# Patient Record
Sex: MALE | Race: WHITE | NOT HISPANIC OR LATINO | Employment: OTHER | URBAN - METROPOLITAN AREA
[De-identification: names, ages, dates, MRNs, and addresses within clinical notes are randomized per-mention and may not be internally consistent; named-entity substitution may affect disease eponyms.]

---

## 2023-05-15 ENCOUNTER — APPOINTMENT (INPATIENT)
Dept: VASCULAR ULTRASOUND | Facility: HOSPITAL | Age: 68
End: 2023-05-15

## 2023-05-15 ENCOUNTER — APPOINTMENT (EMERGENCY)
Dept: CT IMAGING | Facility: HOSPITAL | Age: 68
End: 2023-05-15

## 2023-05-15 ENCOUNTER — APPOINTMENT (EMERGENCY)
Dept: RADIOLOGY | Facility: HOSPITAL | Age: 68
End: 2023-05-15

## 2023-05-15 ENCOUNTER — APPOINTMENT (EMERGENCY)
Dept: NON INVASIVE DIAGNOSTICS | Facility: HOSPITAL | Age: 68
End: 2023-05-15

## 2023-05-15 ENCOUNTER — HOSPITAL ENCOUNTER (INPATIENT)
Facility: HOSPITAL | Age: 68
LOS: 3 days | Discharge: HOME | End: 2023-05-18
Attending: EMERGENCY MEDICINE | Admitting: ANESTHESIOLOGY

## 2023-05-15 DIAGNOSIS — I26.99 PULMONARY EMBOLI (HCC): Primary | ICD-10-CM

## 2023-05-15 DIAGNOSIS — R09.02 HYPOXIA: ICD-10-CM

## 2023-05-15 PROBLEM — I26.09 PULMONARY EMBOLISM WITH ACUTE COR PULMONALE (HCC): Status: ACTIVE | Noted: 2023-05-15

## 2023-05-15 PROBLEM — J96.01 ACUTE RESPIRATORY FAILURE WITH HYPOXEMIA (HCC): Status: ACTIVE | Noted: 2023-05-15

## 2023-05-15 PROBLEM — I82.411 ACUTE DEEP VEIN THROMBOSIS (DVT) OF FEMORAL VEIN OF RIGHT LOWER EXTREMITY (HCC): Status: ACTIVE | Noted: 2023-05-15

## 2023-05-15 PROBLEM — E78.5 HLD (HYPERLIPIDEMIA): Status: ACTIVE | Noted: 2023-05-15

## 2023-05-15 PROBLEM — N17.9 AKI (ACUTE KIDNEY INJURY) (HCC): Status: ACTIVE | Noted: 2023-05-15

## 2023-05-15 PROBLEM — I10 HTN (HYPERTENSION): Status: ACTIVE | Noted: 2023-05-15

## 2023-05-15 PROBLEM — E87.20 LACTIC ACIDOSIS: Status: RESOLVED | Noted: 2023-05-15 | Resolved: 2023-05-15

## 2023-05-15 PROBLEM — E87.20 LACTIC ACIDOSIS: Status: ACTIVE | Noted: 2023-05-15

## 2023-05-15 PROBLEM — R65.10 SIRS (SYSTEMIC INFLAMMATORY RESPONSE SYNDROME) (HCC): Status: ACTIVE | Noted: 2023-05-15

## 2023-05-15 LAB
2HR DELTA HS TROPONIN: 63 NG/L
4HR DELTA HS TROPONIN: 41 NG/L
ALBUMIN SERPL BCP-MCNC: 4 G/DL (ref 3.5–5)
ALP SERPL-CCNC: 100 U/L (ref 34–104)
ALT SERPL W P-5'-P-CCNC: 21 U/L (ref 7–52)
ANION GAP SERPL CALCULATED.3IONS-SCNC: 8 MMOL/L (ref 4–13)
AORTIC ROOT: 3.7 CM
APICAL FOUR CHAMBER EJECTION FRACTION: 63 %
APTT PPP: 194 SECONDS (ref 23–37)
APTT PPP: 28 SECONDS (ref 23–37)
ASCENDING AORTA: 3.5 CM
AST SERPL W P-5'-P-CCNC: 22 U/L (ref 13–39)
BASOPHILS # BLD AUTO: 0.09 THOUSANDS/ÂΜL (ref 0–0.1)
BASOPHILS NFR BLD AUTO: 1 % (ref 0–1)
BILIRUB SERPL-MCNC: 0.64 MG/DL (ref 0.2–1)
BILIRUB UR QL STRIP: NEGATIVE
BNP SERPL-MCNC: 28 PG/ML (ref 0–100)
BUN SERPL-MCNC: 21 MG/DL (ref 5–25)
CALCIUM SERPL-MCNC: 9.5 MG/DL (ref 8.4–10.2)
CARDIAC TROPONIN I PNL SERPL HS: 34 NG/L
CARDIAC TROPONIN I PNL SERPL HS: 75 NG/L
CARDIAC TROPONIN I PNL SERPL HS: 97 NG/L
CHLORIDE SERPL-SCNC: 106 MMOL/L (ref 96–108)
CLARITY UR: CLEAR
CO2 SERPL-SCNC: 26 MMOL/L (ref 21–32)
COLOR UR: YELLOW
CREAT SERPL-MCNC: 1.36 MG/DL (ref 0.6–1.3)
E WAVE DECELERATION TIME: 84 MS
EOSINOPHIL # BLD AUTO: 0.25 THOUSAND/ÂΜL (ref 0–0.61)
EOSINOPHIL NFR BLD AUTO: 2 % (ref 0–6)
ERYTHROCYTE [DISTWIDTH] IN BLOOD BY AUTOMATED COUNT: 13.6 % (ref 11.6–15.1)
FRACTIONAL SHORTENING: 38 % (ref 28–44)
GFR SERPL CREATININE-BSD FRML MDRD: 52 ML/MIN/1.73SQ M
GLUCOSE SERPL-MCNC: 137 MG/DL (ref 65–140)
GLUCOSE UR STRIP-MCNC: NEGATIVE MG/DL
HCT VFR BLD AUTO: 48.1 % (ref 36.5–49.3)
HGB BLD-MCNC: 15.6 G/DL (ref 12–17)
HGB UR QL STRIP.AUTO: NEGATIVE
IMM GRANULOCYTES # BLD AUTO: 0.08 THOUSAND/UL (ref 0–0.2)
IMM GRANULOCYTES NFR BLD AUTO: 1 % (ref 0–2)
INR PPP: 0.96 (ref 0.84–1.19)
INTERVENTRICULAR SEPTUM IN DIASTOLE (PARASTERNAL SHORT AXIS VIEW): 1.1 CM
INTERVENTRICULAR SEPTUM: 1.1 CM (ref 0.6–1.1)
KETONES UR STRIP-MCNC: ABNORMAL MG/DL
LAAS-AP2: 15.5 CM2
LAAS-AP4: 16.9 CM2
LACTATE SERPL-SCNC: 1.4 MMOL/L (ref 0.5–2)
LACTATE SERPL-SCNC: 2.3 MMOL/L (ref 0.5–2)
LEFT ATRIUM AREA SYSTOLE SINGLE PLANE A4C: 16 CM2
LEFT ATRIUM SIZE: 4.3 CM
LEFT INTERNAL DIMENSION IN SYSTOLE: 3 CM (ref 2.1–4)
LEFT VENTRICULAR INTERNAL DIMENSION IN DIASTOLE: 4.8 CM (ref 3.5–6)
LEFT VENTRICULAR POSTERIOR WALL IN END DIASTOLE: 0.9 CM
LEFT VENTRICULAR STROKE VOLUME: 74 ML
LEUKOCYTE ESTERASE UR QL STRIP: NEGATIVE
LVSV (TEICH): 74 ML
LYMPHOCYTES # BLD AUTO: 1.55 THOUSANDS/ÂΜL (ref 0.6–4.47)
LYMPHOCYTES NFR BLD AUTO: 14 % (ref 14–44)
MCH RBC QN AUTO: 29.9 PG (ref 26.8–34.3)
MCHC RBC AUTO-ENTMCNC: 32.4 G/DL (ref 31.4–37.4)
MCV RBC AUTO: 92 FL (ref 82–98)
MONOCYTES # BLD AUTO: 0.72 THOUSAND/ÂΜL (ref 0.17–1.22)
MONOCYTES NFR BLD AUTO: 7 % (ref 4–12)
MV E'TISSUE VEL-SEP: 25 CM/S
MV PEAK A VEL: 0.76 M/S
MV PEAK E VEL: 77 CM/S
MV STENOSIS PRESSURE HALF TIME: 24 MS
MV VALVE AREA P 1/2 METHOD: 9.17 CM2
NEUTROPHILS # BLD AUTO: 8.39 THOUSANDS/ÂΜL (ref 1.85–7.62)
NEUTS SEG NFR BLD AUTO: 75 % (ref 43–75)
NITRITE UR QL STRIP: NEGATIVE
NRBC BLD AUTO-RTO: 0 /100 WBCS
PH UR STRIP.AUTO: 5.5 [PH]
PLATELET # BLD AUTO: 320 THOUSANDS/UL (ref 149–390)
PMV BLD AUTO: 8.7 FL (ref 8.9–12.7)
POTASSIUM SERPL-SCNC: 4.3 MMOL/L (ref 3.5–5.3)
PROCALCITONIN SERPL-MCNC: 0.08 NG/ML
PROT SERPL-MCNC: 7.6 G/DL (ref 6.4–8.4)
PROT UR STRIP-MCNC: NEGATIVE MG/DL
PROTHROMBIN TIME: 12.6 SECONDS (ref 11.6–14.5)
RBC # BLD AUTO: 5.21 MILLION/UL (ref 3.88–5.62)
RIGHT ATRIUM AREA SYSTOLE A4C: 19.2 CM2
RIGHT VENTRICLE ID DIMENSION: 4.2 CM
SL CV LEFT ATRIUM LENGTH A2C: 4.4 CM
SL CV PED ECHO LEFT VENTRICLE DIASTOLIC VOLUME (MOD BIPLANE) 2D: 108 ML
SL CV PED ECHO LEFT VENTRICLE SYSTOLIC VOLUME (MOD BIPLANE) 2D: 35 ML
SODIUM SERPL-SCNC: 140 MMOL/L (ref 135–147)
SP GR UR STRIP.AUTO: 1.03 (ref 1–1.03)
TR MAX PG: 53 MMHG
TR PEAK VELOCITY: 3.7 M/S
TRICUSPID ANNULAR PLANE SYSTOLIC EXCURSION: 1.7 CM
TRICUSPID VALVE PEAK REGURGITATION VELOCITY: 3.65 M/S
UROBILINOGEN UR STRIP-ACNC: <2 MG/DL
WBC # BLD AUTO: 11.08 THOUSAND/UL (ref 4.31–10.16)

## 2023-05-15 RX ORDER — LISINOPRIL 5 MG/1
5 TABLET ORAL DAILY
COMMUNITY
End: 2023-05-18

## 2023-05-15 RX ORDER — CHLORHEXIDINE GLUCONATE 0.12 MG/ML
15 RINSE ORAL EVERY 12 HOURS SCHEDULED
Status: DISCONTINUED | OUTPATIENT
Start: 2023-05-15 | End: 2023-05-18 | Stop reason: HOSPADM

## 2023-05-15 RX ORDER — ALBUTEROL SULFATE 2.5 MG/3ML
5 SOLUTION RESPIRATORY (INHALATION) ONCE
Status: COMPLETED | OUTPATIENT
Start: 2023-05-15 | End: 2023-05-15

## 2023-05-15 RX ORDER — HEPARIN SODIUM 1000 [USP'U]/ML
9200 INJECTION, SOLUTION INTRAVENOUS; SUBCUTANEOUS EVERY 6 HOURS PRN
Status: DISCONTINUED | OUTPATIENT
Start: 2023-05-15 | End: 2023-05-18

## 2023-05-15 RX ORDER — ATORVASTATIN CALCIUM 10 MG/1
20 TABLET, FILM COATED ORAL DAILY
Status: DISCONTINUED | OUTPATIENT
Start: 2023-05-15 | End: 2023-05-18 | Stop reason: HOSPADM

## 2023-05-15 RX ORDER — METHYLPREDNISOLONE SODIUM SUCCINATE 125 MG/2ML
60 INJECTION, POWDER, LYOPHILIZED, FOR SOLUTION INTRAMUSCULAR; INTRAVENOUS ONCE
Status: COMPLETED | OUTPATIENT
Start: 2023-05-15 | End: 2023-05-15

## 2023-05-15 RX ORDER — HEPARIN SODIUM 1000 [USP'U]/ML
4600 INJECTION, SOLUTION INTRAVENOUS; SUBCUTANEOUS EVERY 6 HOURS PRN
Status: DISCONTINUED | OUTPATIENT
Start: 2023-05-15 | End: 2023-05-18

## 2023-05-15 RX ORDER — HEPARIN SODIUM 1000 [USP'U]/ML
9200 INJECTION, SOLUTION INTRAVENOUS; SUBCUTANEOUS ONCE
Status: COMPLETED | OUTPATIENT
Start: 2023-05-15 | End: 2023-05-15

## 2023-05-15 RX ORDER — ATORVASTATIN CALCIUM 20 MG/1
20 TABLET, FILM COATED ORAL DAILY
COMMUNITY

## 2023-05-15 RX ORDER — HEPARIN SODIUM 10000 [USP'U]/100ML
3-30 INJECTION, SOLUTION INTRAVENOUS
Status: DISCONTINUED | OUTPATIENT
Start: 2023-05-15 | End: 2023-05-16

## 2023-05-15 RX ADMIN — ALBUTEROL SULFATE 5 MG: 2.5 SOLUTION RESPIRATORY (INHALATION) at 10:47

## 2023-05-15 RX ADMIN — HEPARIN SODIUM 9200 UNITS: 1000 INJECTION INTRAVENOUS; SUBCUTANEOUS at 12:37

## 2023-05-15 RX ADMIN — CEFTRIAXONE SODIUM 1000 MG: 10 INJECTION, POWDER, FOR SOLUTION INTRAVENOUS at 10:47

## 2023-05-15 RX ADMIN — IPRATROPIUM BROMIDE 0.5 MG: 0.5 SOLUTION RESPIRATORY (INHALATION) at 10:47

## 2023-05-15 RX ADMIN — CHLORHEXIDINE GLUCONATE 0.12% ORAL RINSE 15 ML: 1.2 LIQUID ORAL at 17:00

## 2023-05-15 RX ADMIN — HEPARIN SODIUM 18 UNITS/KG/HR: 10000 INJECTION, SOLUTION INTRAVENOUS at 12:42

## 2023-05-15 RX ADMIN — IOHEXOL 85 ML: 350 INJECTION, SOLUTION INTRAVENOUS at 10:38

## 2023-05-15 RX ADMIN — ATORVASTATIN CALCIUM 20 MG: 10 TABLET, FILM COATED ORAL at 17:00

## 2023-05-15 RX ADMIN — METHYLPREDNISOLONE SODIUM SUCCINATE 60 MG: 125 INJECTION, POWDER, FOR SOLUTION INTRAMUSCULAR; INTRAVENOUS at 10:44

## 2023-05-15 NOTE — ASSESSMENT & PLAN NOTE
· Likely prerenal in the setting dehydration insensible losses   · Creatinine on admission 1 36, continue to trend on daily labs  · Monitor I's and O's

## 2023-05-15 NOTE — ASSESSMENT & PLAN NOTE
· Presented today from PCPs office with ongoing shortness of breath   · In the ED was found to be hypoxic with an O2 sat of 85%, placed on 4 L nasal cannula  · CT extensive bilateral PE and near complete occlusion with a saddle embolus of the right lower lobe pulmonary arteries and notable right heart strain   LV/RV ratio 1 6  · Echo with RV, IVC, right atrium dilation, tricuspid regurgitation and elevated right ventricular systolic pressure  · Received heparin bolus and heparin drip initiated  · Venous duplex pending  · Troponin peaked at 97  · BNP 28  · Cardiology consulted  · Continue heparin drip and supplemental O2

## 2023-05-15 NOTE — ED PROVIDER NOTES
History  Chief Complaint   Patient presents with   • Shortness of Breath     Sent by pcp due to sob, 85% RA, no home 02 hx, productive cough recently       HPI  76 yo M presents with SOB since yesterday  Was seen by his primary care doctor today, found to be hypoxic  +Cough  Denies leg pain/swelling, history of DVT or PE  Reports history of smoking, denies any medical problems besides high blood pressure and cholesterol, has been compliant with medications  None       Past Medical History:   Diagnosis Date   • High cholesterol    • Hypertension        No past surgical history on file  No family history on file  I have reviewed and agree with the history as documented  E-Cigarette/Vaping     E-Cigarette/Vaping Substances          Review of Systems   Constitutional: Negative for chills and fever  HENT: Negative for dental problem and ear pain  Eyes: Negative for pain and redness  Respiratory: Positive for cough and shortness of breath  Cardiovascular: Negative for chest pain and palpitations  Gastrointestinal: Negative for abdominal pain and nausea  Endocrine: Negative for polydipsia and polyphagia  Genitourinary: Negative for dysuria and frequency  Musculoskeletal: Negative for arthralgias and joint swelling  Skin: Negative for color change and rash  Neurological: Negative for dizziness and headaches  Psychiatric/Behavioral: Negative for behavioral problems and confusion  All other systems reviewed and are negative  Physical Exam  Physical Exam  Vitals and nursing note reviewed  Constitutional:       General: He is not in acute distress  Appearance: He is well-developed  He is not diaphoretic  HENT:      Head: Atraumatic  Right Ear: External ear normal       Left Ear: External ear normal       Nose: Nose normal    Eyes:      Conjunctiva/sclera: Conjunctivae normal       Pupils: Pupils are equal, round, and reactive to light  Neck:      Vascular: No JVD  Cardiovascular:      Rate and Rhythm: Regular rhythm  Tachycardia present  Heart sounds: Normal heart sounds  No murmur heard  Pulmonary:      Effort: Pulmonary effort is normal  Tachypnea present  No respiratory distress  Breath sounds: No wheezing  Abdominal:      General: Bowel sounds are normal  There is no distension  Palpations: Abdomen is soft  Tenderness: There is no abdominal tenderness  Musculoskeletal:         General: Normal range of motion  Cervical back: Normal range of motion and neck supple  Skin:     General: Skin is warm and dry  Capillary Refill: Capillary refill takes less than 2 seconds  Neurological:      Mental Status: He is alert and oriented to person, place, and time  Cranial Nerves: No cranial nerve deficit     Psychiatric:         Behavior: Behavior normal          Vital Signs  ED Triage Vitals   Temperature Pulse Respirations Blood Pressure SpO2   05/15/23 1004 05/15/23 0957 05/15/23 0957 05/15/23 0957 05/15/23 0957   97 6 °F (36 4 °C) (!) 131 22 119/72 93 %      Temp Source Heart Rate Source Patient Position - Orthostatic VS BP Location FiO2 (%)   05/15/23 1004 05/15/23 1030 05/15/23 1330 05/15/23 1330 --   Oral Monitor Lying Right arm       Pain Score       --                  Vitals:    05/15/23 1200 05/15/23 1330 05/15/23 1400 05/15/23 1415   BP:    110/62   Pulse: 104 104 (!) 108 105   Patient Position - Orthostatic VS:  Lying  Lying         Visual Acuity      ED Medications  Medications   heparin (porcine) 25,000 units in 0 45% NaCl 250 mL infusion (premix) (18 Units/kg/hr × 115 kg (Order-Specific) Intravenous New Bag 5/15/23 1242)   heparin (porcine) injection 9,200 Units (has no administration in time range)   heparin (porcine) injection 4,600 Units (has no administration in time range)   chlorhexidine (PERIDEX) 0 12 % oral rinse 15 mL (has no administration in time range)   ceftriaxone (ROCEPHIN) 1 g/50 mL in dextrose IVPB (0 mg Intravenous Stopped 5/15/23 1117)   methylPREDNISolone sodium succinate (Solu-MEDROL) injection 60 mg (60 mg Intravenous Given 5/15/23 1044)   albuterol inhalation solution 5 mg (5 mg Nebulization Given 5/15/23 1047)   ipratropium (ATROVENT) 0 02 % inhalation solution 0 5 mg (0 5 mg Nebulization Given 5/15/23 1047)   iohexol (OMNIPAQUE) 350 MG/ML injection (SINGLE-DOSE) 85 mL (85 mL Intravenous Given 5/15/23 1038)   heparin (porcine) injection 9,200 Units (9,200 Units Intravenous Given 5/15/23 1237)       Diagnostic Studies  Results Reviewed     Procedure Component Value Units Date/Time    HS Troponin I 4hr [[id number]]  (Abnormal) Collected: 05/15/23 1413    Lab Status: Final result Specimen: Blood from Hand, Right Updated: 05/15/23 1453     hs TnI 4hr 75 ng/L      Delta 4hr hsTnI 41 ng/L     UA w Reflex to Microscopic w Reflex to Culture [[id number]]  (Abnormal) Collected: 05/15/23 1357    Lab Status: Final result Specimen: Urine, Clean Catch Updated: 05/15/23 1406     Color, UA Yellow     Clarity, UA Clear     Specific Gravity, UA 1 035     pH, UA 5 5     Leukocytes, UA Negative     Nitrite, UA Negative     Protein, UA Negative mg/dl      Glucose, UA Negative mg/dl      Ketones, UA Trace mg/dl      Urobilinogen, UA <2 0 mg/dl      Bilirubin, UA Negative     Occult Blood, UA Negative    HS Troponin I 2hr [[id number]]  (Abnormal) Collected: 05/15/23 1226    Lab Status: Final result Specimen: Blood from Arm, Right Updated: 05/15/23 1259     hs TnI 2hr 97 ng/L      Delta 2hr hsTnI 63 ng/L     Lactic acid 2 Hours [[id number]]  (Normal) Collected: 05/15/23 1226    Lab Status: Final result Specimen: Blood from Arm, Right Updated: 05/15/23 1248     LACTIC ACID 1 4 mmol/L     Narrative:      Result may be elevated if tourniquet was used during collection      B-Type Natriuretic Peptide(BNP) [[id number]]  (Normal) Collected: 05/15/23 1002    Lab Status: Final result Specimen: Blood from Arm, Right Updated: 05/15/23 1126     BNP 28 pg/mL     Procalcitonin [[id number]]  (Normal) Collected: 05/15/23 1003    Lab Status: Final result Specimen: Blood from Arm, Right Updated: 05/15/23 1040     Procalcitonin 0 08 ng/ml     HS Troponin 0hr (reflex protocol) [[id number]]  (Normal) Collected: 05/15/23 1013    Lab Status: Final result Specimen: Blood Updated: 05/15/23 1038     hs TnI 0hr 34 ng/L     Lactic acid, plasma (w/reflex if result > 2 0) [[id number]]  (Abnormal) Collected: 05/15/23 1003    Lab Status: Final result Specimen: Blood from Arm, Right Updated: 05/15/23 1034     LACTIC ACID 2 3 mmol/L     Narrative:      Result may be elevated if tourniquet was used during collection      Comprehensive metabolic panel [[id number]]  (Abnormal) Collected: 05/15/23 1003    Lab Status: Final result Specimen: Blood from Arm, Right Updated: 05/15/23 1030     Sodium 140 mmol/L      Potassium 4 3 mmol/L      Chloride 106 mmol/L      CO2 26 mmol/L      ANION GAP 8 mmol/L      BUN 21 mg/dL      Creatinine 1 36 mg/dL      Glucose 137 mg/dL      Calcium 9 5 mg/dL      AST 22 U/L      ALT 21 U/L      Alkaline Phosphatase 100 U/L      Total Protein 7 6 g/dL      Albumin 4 0 g/dL      Total Bilirubin 0 64 mg/dL      eGFR 52 ml/min/1 73sq m     Narrative:      [place] guidelines for Chronic Kidney Disease (CKD):   •  Stage 1 with normal or high GFR (GFR > 90 mL/min/1 73 square meters)  •  Stage 2 Mild CKD (GFR = 60-89 mL/min/1 73 square meters)  •  Stage 3A Moderate CKD (GFR = 45-59 mL/min/1 73 square meters)  •  Stage 3B Moderate CKD (GFR = 30-44 mL/min/1 73 square meters)  •  Stage 4 Severe CKD (GFR = 15-29 mL/min/1 73 square meters)  •  Stage 5 End Stage CKD (GFR <15 mL/min/1 73 square meters)  Note: GFR calculation is accurate only with a steady state creatinine    Protime-INR [[id number]]  (Normal) Collected: 05/15/23 1003    Lab Status: Final result Specimen: Blood from Arm, Right Updated: 05/15/23 1028     Protime 12 6 seconds      INR 0  96    APTT [[id number]]  (Normal) Collected: 05/15/23 1003    Lab Status: Final result Specimen: Blood from Arm, Right Updated: 05/15/23 1028     PTT 28 seconds     CBC and differential [[id number]]  (Abnormal) Collected: 05/15/23 1002    Lab Status: Final result Specimen: Blood from Arm, Right Updated: 05/15/23 1015     WBC 11 08 Thousand/uL      RBC 5 21 Million/uL      Hemoglobin 15 6 g/dL      Hematocrit 48 1 %      MCV 92 fL      MCH 29 9 pg      MCHC 32 4 g/dL      RDW 13 6 %      MPV 8 7 fL      Platelets 429 Thousands/uL      nRBC 0 /100 WBCs      Neutrophils Relative 75 %      Immat GRANS % 1 %      Lymphocytes Relative 14 %      Monocytes Relative 7 %      Eosinophils Relative 2 %      Basophils Relative 1 %      Neutrophils Absolute 8 39 Thousands/µL      Immature Grans Absolute 0 08 Thousand/uL      Lymphocytes Absolute 1 55 Thousands/µL      Monocytes Absolute 0 72 Thousand/µL      Eosinophils Absolute 0 25 Thousand/µL      Basophils Absolute 0 09 Thousands/µL     Blood culture #2 [[id number]] Collected: 05/15/23 1003    Lab Status: In process Specimen: Blood from Arm, Right Updated: 05/15/23 1010    Blood culture #1 [[id number]] Collected: 05/15/23 1002    Lab Status: In process Specimen: Blood from Hand, Right Updated: 05/15/23 1010                 CTA ED chest PE Study   Final Result by Chiki Sarkar MD (05/15 1104)      Extensive bilateral pulmonary emboli, greater on the right, with near complete occlusion of the right lower lobe pulmonary arteries with saddle embolus  Elevation of the RV/LV diameter ratio at 1 6 indicating right heart strain  Mild peripheral groundglass opacity in the right upper lobe which could be infectious/inflammatory or could be a pulmonary infarct  Large hiatal hernia        I personally discussed this study with Dr Mae Sauceda on 5/15/2023 11:04 AM             Workstation performed: [id number]         XR chest 2 views   Final Result by Franklyn Dunn, MD (05/15 1422)      No acute cardiopulmonary disease  Workstation performed: [id number]                    Procedures  ECG 12 Lead Documentation Only    Date/Time: 5/15/2023 3:05 PM  Performed by: Jessica Silva MD  Authorized by: Jessica Silva MD     Comments:      Sinus tachycardia rate of 127 RBBB no acute ST elevations or depressions  CriticalCare Time    Date/Time: 5/15/2023 3:08 PM  Performed by: Jessica Silva MD  Authorized by: Jessica Silva MD     Critical care provider statement:     Critical care time (minutes):  42    Critical care was necessary to treat or prevent imminent or life-threatening deterioration of the following conditions: PE requiring oxygen, anticoagulation  ED Course                               SBIRT 20yo+    Flowsheet Row Most Recent Value   Initial Alcohol Screen: US AUDIT-C     1  How often do you have a drink containing alcohol? 0 Filed at: 05/15/2023 0956   2  How many drinks containing alcohol do you have on a typical day you are drinking? 0 Filed at: 05/15/2023 0956   3a  Male UNDER 65: How often do you have five or more drinks on one occasion? 0 Filed at: 05/15/2023 0956   3b  FEMALE Any Age, or MALE 65+: How often do you have 4 or more drinks on one occassion? 0 Filed at: 05/15/2023 0956   Audit-C Score 0 Filed at: 05/15/2023 2947   THOMAS: How many times in the past year have you    Used an illegal drug or used a prescription medication for non-medical reasons? Never Filed at: 05/15/2023 0956                    MDM  CTA PE shows bilateral PE, +hypoxia and tachycardia  Heparin gtt ordered   Discussed with critical care, will admit  Disposition  Final diagnoses:   Pulmonary emboli ([address] )   Hypoxia     Time reflects when diagnosis was documented in both MDM as applicable and the Disposition within this note     Time User Action Codes Description Comment    5/15/2023  1:09 PM Cynthia Shields Add [I26 99] Pulmonary emboli ([address] )     5/15/2023  1:09 PM Lisa Murdock Add [R09 02] Hypoxia       ED Disposition     ED Disposition   Admit    Condition   Stable    Date/Time   Mon May 15, 2023  1:09 PM    Comment   Case was discussed with Dr Lorene Pack and the patient's admission status was agreed to be Admission Status: inpatient status to the service of Dr Lorene Pack   Follow-up Information    None         There are no discharge medications for this patient  No discharge procedures on file      PDMP Review     None          ED Provider  Electronically Signed by           Karoline Ferrara MD  05/15/23 6897

## 2023-05-15 NOTE — ASSESSMENT & PLAN NOTE
· Likely in the setting of acute PE with right heart strain  · Hold on antibiotics  · Cultures pending  · Continue telemetry and supplemental O2

## 2023-05-15 NOTE — ASSESSMENT & PLAN NOTE
· Presented today with shortness of breath from his PCPs office  · Oxygen saturation in the ED was 85% on room air, aced on 4 L nasal cannula  · In the ED he received Rocephin x1, albuterol neb, Atrovent neb, IV Solu-Medrol, heparin bolus  · PE study notable for Extensive bilateral pulmonary emboli, greater on the right, with near complete occlusion of the right lower lobe pulmonary arteries with saddle embolus   RV/LV  ratio at 1 6   · ECHO EF 60%, RV dilation, IVC dilation, RT atrium dilation, Tricuspid regurgitation, RVSP 68  · PESI score 120  · Continue heparin gtt  · Troponin peaked at 97  · Continue supplemental 02 for WOB

## 2023-05-15 NOTE — ED NOTES
IV attempts by writer unsuccessful  Charge nurse Angelic Knox and Provider Joselin Oh made aware that PT needs two IV's for PT care IV medication administration       Julien Rodriguez RN  05/15/23 0920

## 2023-05-15 NOTE — ASSESSMENT & PLAN NOTE
· Continue oxygen supplementation for goal SpO2>94%  · Encourage good pulmonary hygiene  · Respiratory protocol

## 2023-05-15 NOTE — ASSESSMENT & PLAN NOTE
· Presented w/ tachypnea, tachycardia, LULI, lactic acidosis, acute respiratory failure with hypoxia  · Procalcitonin negative   · Likely in the setting of acute PE with right heart strain  · Received Rocephin in the ED  · Cultures pending  · Continue telemetry and supplemental O2

## 2023-05-15 NOTE — H&P
[address] [place] [address]  H&P  Name: Ritesh Mcguire 77 y o  male I MRN: [id number]  Unit/Bed#:  I Date of Admission: 5/15/2023   Date of Service: 5/15/2023 I Hospital Day: 0      Assessment/Plan   * Pulmonary embolism with acute cor pulmonale (HCC)  Assessment & Plan  · Presented today from PCPs office with ongoing shortness of breath   · In the ED was found to be hypoxic with an O2 sat of 85%, placed on 4 L nasal cannula  · CT extensive bilateral PE and near complete occlusion with a saddle embolus of the right lower lobe pulmonary arteries and notable right heart strain  LV/RV ratio 1 6  · Echo with RV, IVC, right atrium dilation, tricuspid regurgitation and elevated right ventricular systolic pressure  · Received heparin bolus and heparin drip initiated  · Venous duplex pending  · Troponin peaked at 97  · BNP 28  · Cardiology consulted  · Continue heparin drip and supplemental O2    Acute respiratory failure with hypoxemia (HCC)  Assessment & Plan  · Presented today with shortness of breath from his PCPs office  · Oxygen saturation in the ED was 85% on room air, aced on 4 L nasal cannula  · In the ED he received Rocephin x1, albuterol neb, Atrovent neb, IV Solu-Medrol, heparin bolus  · PE study notable for Extensive bilateral pulmonary emboli, greater on the right, with near complete occlusion of the right lower lobe pulmonary arteries with saddle embolus   RV/LV  ratio at 1 6   · ECHO EF 60%, RV dilation, IVC dilation, RT atrium dilation, Tricuspid regurgitation, RVSP 68  · PESI score 120  · Continue heparin gtt  · Troponin peaked at 97  · Continue supplemental 02 for WOB         SIRS (systemic inflammatory response syndrome) (HCC)  Assessment & Plan  · Presented w/ tachypnea, tachycardia, LULI, lactic acidosis, acute respiratory failure with hypoxia  · Procalcitonin negative   · Likely in the setting of acute PE with right heart strain  · Received Rocephin in the ED  · Cultures "pending  · Continue telemetry and supplemental O2    LULI (acute kidney injury) ([facility] [address] )  Assessment & Plan  · Likely prerenal in the setting dehydration insensible losses   · Creatinine on admission 1 36, continue to trend on daily labs  · Monitor I's and O's    HTN (hypertension)  Assessment & Plan  · Currently normotensive, Hold Lisinopril in setting of LULI    HLD (hyperlipidemia)  Assessment & Plan  · Continue Lipitor    Lactic acidosis  Assessment & Plan  · Likely In setting of acute respiratory failure   · Initial Lactic 2 3, repeat 1 4  · Resolved         History of Present Illness     HPI: Gordon Luciano is a 77 y o  who presents from PCP w/ acute SOB  Pt reports 3 weeks of cough, but today he awoke to feeling as though he \"counld not catch his breath  \" He presented to his PCP for eval and was referred to ED for hypoxia and respiratory distress  In the ED he was hypoxic @ 85% on RA  He was placed on 4L NC, given neb treatments, IV solumedrol, & Rocephin  He met SIRS criteria AEB tachypnea, tachycardia, hypoxia, as well as an LULI and lactic acidosis  PMH significant for HTN & HLD  PE study notable extensive PE, right greater than left with near complete occlusion of the right lower lobe pulmonary arteries with saddle emboli, LV/RV ratio 1 6  Started on heparin gtt  Transferred to ICU as SD level of care for further management    History obtained from chart review  Review of Systems   Constitutional: Negative for appetite change, diaphoresis and fever  HENT: Negative  Respiratory: Positive for cough and shortness of breath  Negative for chest tightness and wheezing  Cardiovascular: Negative  Negative for chest pain and palpitations  Gastrointestinal: Negative for abdominal pain, diarrhea, nausea and vomiting  Genitourinary: Negative  Musculoskeletal: Negative  Skin: Negative  Neurological: Negative  Psychiatric/Behavioral: Negative               Historical Information   Past Medical " History:  No date: High cholesterol  No date: Hypertension Past Surgical History:  No date: COLON SURGERY   Current Outpatient Medications   Medication Instructions   • atorvastatin (LIPITOR) 20 mg, Oral, Daily   • lisinopril (ZESTRIL) 5 mg, Oral, Daily    Allergies   Allergen Reactions   • Tetracycline Hives     1973        Social History     Tobacco Use   • Smoking status: Some Days     Types: Cigars   • Smokeless tobacco: Never   Substance Use Topics   • Alcohol use: Yes     Alcohol/week: 5 0 standard drinks     Types: 5 Standard drinks or equivalent per week    History reviewed  No pertinent family history  Objective                            Vitals I/O      Most Recent Min/Max in 24hrs   Temp 98 2 °F (36 8 °C) Temp  Min: 97 6 °F (36 4 °C)  Max: 98 2 °F (36 8 °C)   Pulse (!) 107 Pulse  Min: 104  Max: 131   Resp 20 Resp  Min: 14  Max: 22   /76 BP  Min: 104/61  Max: 122/76   O2 Sat (!) 88 % SpO2  Min: 88 %  Max: 99 %      Intake/Output Summary (Last 24 hours) at 5/15/2023 1728  Last data filed at 5/15/2023 1520  Gross per 24 hour   Intake 104 51 ml   Output --   Net 104 51 ml         Diet Cardiovascular; Cardiac     Invasive Monitoring Physical exam    Physical Exam  Vitals and nursing note reviewed  Constitutional:       General: He is not in acute distress  Appearance: Normal appearance  HENT:      Head: Normocephalic  Mouth/Throat:      Mouth: Mucous membranes are dry  Pharynx: Oropharynx is clear  Eyes:      Pupils: Pupils are equal, round, and reactive to light  Cardiovascular:      Rate and Rhythm: Normal rate and regular rhythm  Pulses: Normal pulses  Heart sounds: Normal heart sounds  Pulmonary:      Effort: Pulmonary effort is normal  No respiratory distress  Breath sounds: Normal breath sounds  No wheezing or rhonchi  Abdominal:      General: Bowel sounds are normal       Palpations: Abdomen is soft  Tenderness: There is no abdominal tenderness  Musculoskeletal:      Right lower leg: Edema present  Left lower leg: Edema present  Skin:     General: Skin is warm and dry  Capillary Refill: Capillary refill takes less than 2 seconds  Neurological:      Mental Status: He is alert and oriented to person, place, and time  Psychiatric:         Mood and Affect: Mood normal          Behavior: Behavior normal               Diagnostic Studies    EKG: Sinus Rhythm  Imaging:  I have personally reviewed pertinent reports  Medications:  Scheduled PRN   atorvastatin, 20 mg, Daily  chlorhexidine, 15 mL, Q12H LÁZARO      heparin (porcine), 4,600 Units, Q6H PRN  heparin (porcine), 9,200 Units, Q6H PRN       Continuous    heparin (porcine), 3-30 Units/kg/hr (Order-Specific), Last Rate: 18 Units/kg/hr (05/15/23 1511)         Labs:    CBC    Recent Labs     05/15/23  1002   WBC 11 08*   HGB 15 6   HCT 48 1        BMP    Recent Labs     05/15/23  1003   SODIUM 140   K 4 3      CO2 26   AGAP 8   BUN 21   CREATININE 1 36*   CALCIUM 9 5       Coags    Recent Labs     05/15/23  1003   INR 0 96   PTT 28        Additional Electrolytes  No recent results       Blood Gas    No recent results  No recent results LFTs  Recent Labs     05/15/23  1003   ALT 21   AST 22   ALKPHOS 100   ALB 4 0   TBILI 0 64       Infectious  Recent Labs     05/15/23  1003   PROCALCITONI 0 08     Glucose  Recent Labs     05/15/23  1003   GLUC 137             Critical Care Time Delivered: Upon my evaluation, this patient had a high probability of imminent or life-threatening deterioration due to Pulmonary Embolism, which required my direct attention, intervention, and personal management  I have personally provided 60 minutes of critical care time, exclusive of procedures, teaching, family meetings, and any prior time recorded by providers other than myself     Anticipated Length of Stay is > 2 midnights  IVANNA Miner

## 2023-05-15 NOTE — ASSESSMENT & PLAN NOTE
· Continue heparin infusion  · Discuss with case management/cardiology conversion to oral anticoagulation

## 2023-05-15 NOTE — ASSESSMENT & PLAN NOTE
· Continue heparin infusion for now  · ECHO completed with evidence of right heart strain  · Discuss with case management/cardiology oral anticoagulation  · Pending lower extremity duplex

## 2023-05-16 LAB
ALBUMIN SERPL BCP-MCNC: 3.4 G/DL (ref 3.5–5)
ALP SERPL-CCNC: 82 U/L (ref 34–104)
ALT SERPL W P-5'-P-CCNC: 16 U/L (ref 7–52)
ANION GAP SERPL CALCULATED.3IONS-SCNC: 6 MMOL/L (ref 4–13)
APTT PPP: 106 SECONDS (ref 23–37)
APTT PPP: 57 SECONDS (ref 23–37)
APTT PPP: 89 SECONDS (ref 23–37)
AST SERPL W P-5'-P-CCNC: 21 U/L (ref 13–39)
ATRIAL RATE: 101 BPM
ATRIAL RATE: 112 BPM
ATRIAL RATE: 127 BPM
BASOPHILS # BLD AUTO: 0.02 THOUSANDS/ÂΜL (ref 0–0.1)
BASOPHILS NFR BLD AUTO: 0 % (ref 0–1)
BILIRUB SERPL-MCNC: 0.5 MG/DL (ref 0.2–1)
BUN SERPL-MCNC: 20 MG/DL (ref 5–25)
CA-I BLD-SCNC: 1.07 MMOL/L (ref 1.12–1.32)
CALCIUM ALBUM COR SERPL-MCNC: 9.1 MG/DL (ref 8.3–10.1)
CALCIUM SERPL-MCNC: 8.6 MG/DL (ref 8.4–10.2)
CHLORIDE SERPL-SCNC: 109 MMOL/L (ref 96–108)
CO2 SERPL-SCNC: 23 MMOL/L (ref 21–32)
CREAT SERPL-MCNC: 0.88 MG/DL (ref 0.6–1.3)
EOSINOPHIL # BLD AUTO: 0 THOUSAND/ÂΜL (ref 0–0.61)
EOSINOPHIL NFR BLD AUTO: 0 % (ref 0–6)
ERYTHROCYTE [DISTWIDTH] IN BLOOD BY AUTOMATED COUNT: 13.7 % (ref 11.6–15.1)
GFR SERPL CREATININE-BSD FRML MDRD: 87 ML/MIN/1.73SQ M
GLUCOSE SERPL-MCNC: 112 MG/DL (ref 65–140)
GLUCOSE SERPL-MCNC: 137 MG/DL (ref 65–140)
HCT VFR BLD AUTO: 41.9 % (ref 36.5–49.3)
HGB BLD-MCNC: 13.9 G/DL (ref 12–17)
IMM GRANULOCYTES # BLD AUTO: 0.06 THOUSAND/UL (ref 0–0.2)
IMM GRANULOCYTES NFR BLD AUTO: 0 % (ref 0–2)
LYMPHOCYTES # BLD AUTO: 0.74 THOUSANDS/ÂΜL (ref 0.6–4.47)
LYMPHOCYTES NFR BLD AUTO: 5 % (ref 14–44)
MAGNESIUM SERPL-MCNC: 2.2 MG/DL (ref 1.9–2.7)
MCH RBC QN AUTO: 30 PG (ref 26.8–34.3)
MCHC RBC AUTO-ENTMCNC: 33.2 G/DL (ref 31.4–37.4)
MCV RBC AUTO: 90 FL (ref 82–98)
MONOCYTES # BLD AUTO: 0.52 THOUSAND/ÂΜL (ref 0.17–1.22)
MONOCYTES NFR BLD AUTO: 4 % (ref 4–12)
NEUTROPHILS # BLD AUTO: 12.77 THOUSANDS/ÂΜL (ref 1.85–7.62)
NEUTS SEG NFR BLD AUTO: 91 % (ref 43–75)
NRBC BLD AUTO-RTO: 0 /100 WBCS
P AXIS: 16 DEGREES
P AXIS: 27 DEGREES
P AXIS: 46 DEGREES
PHOSPHATE SERPL-MCNC: 3.5 MG/DL (ref 2.3–4.1)
PLATELET # BLD AUTO: 268 THOUSANDS/UL (ref 149–390)
PMV BLD AUTO: 8.9 FL (ref 8.9–12.7)
POTASSIUM SERPL-SCNC: 4.5 MMOL/L (ref 3.5–5.3)
PR INTERVAL: 144 MS
PR INTERVAL: 148 MS
PR INTERVAL: 170 MS
PROT SERPL-MCNC: 6.3 G/DL (ref 6.4–8.4)
QRS AXIS: 267 DEGREES
QRS AXIS: 268 DEGREES
QRS AXIS: 269 DEGREES
QRSD INTERVAL: 144 MS
QRSD INTERVAL: 152 MS
QRSD INTERVAL: 152 MS
QT INTERVAL: 314 MS
QT INTERVAL: 348 MS
QT INTERVAL: 364 MS
QTC INTERVAL: 456 MS
QTC INTERVAL: 471 MS
QTC INTERVAL: 475 MS
RBC # BLD AUTO: 4.64 MILLION/UL (ref 3.88–5.62)
SODIUM SERPL-SCNC: 138 MMOL/L (ref 135–147)
T WAVE AXIS: 14 DEGREES
T WAVE AXIS: 36 DEGREES
T WAVE AXIS: 52 DEGREES
VENTRICULAR RATE: 101 BPM
VENTRICULAR RATE: 112 BPM
VENTRICULAR RATE: 127 BPM
WBC # BLD AUTO: 14.11 THOUSAND/UL (ref 4.31–10.16)

## 2023-05-16 RX ORDER — HEPARIN SODIUM 10000 [USP'U]/100ML
3-30 INJECTION, SOLUTION INTRAVENOUS
Status: ACTIVE | OUTPATIENT
Start: 2023-05-16 | End: 2023-05-16

## 2023-05-16 RX ORDER — DILTIAZEM HYDROCHLORIDE 360 MG/1
360 CAPSULE, EXTENDED RELEASE ORAL DAILY
COMMUNITY

## 2023-05-16 RX ORDER — CALCIUM GLUCONATE 20 MG/ML
2 INJECTION, SOLUTION INTRAVENOUS ONCE
Status: COMPLETED | OUTPATIENT
Start: 2023-05-16 | End: 2023-05-16

## 2023-05-16 RX ORDER — LISINOPRIL 5 MG/1
5 TABLET ORAL DAILY
Status: DISCONTINUED | OUTPATIENT
Start: 2023-05-16 | End: 2023-05-16

## 2023-05-16 RX ORDER — DILTIAZEM HYDROCHLORIDE 120 MG/1
360 CAPSULE, COATED, EXTENDED RELEASE ORAL DAILY
Status: DISCONTINUED | OUTPATIENT
Start: 2023-05-16 | End: 2023-05-18 | Stop reason: HOSPADM

## 2023-05-16 RX ORDER — HEPARIN SODIUM 10000 [USP'U]/100ML
3-30 INJECTION, SOLUTION INTRAVENOUS
Status: DISCONTINUED | OUTPATIENT
Start: 2023-05-16 | End: 2023-05-16

## 2023-05-16 RX ORDER — LISINOPRIL 5 MG/1
5 TABLET ORAL DAILY
Status: DISCONTINUED | OUTPATIENT
Start: 2023-05-17 | End: 2023-05-18 | Stop reason: HOSPADM

## 2023-05-16 RX ADMIN — CHLORHEXIDINE GLUCONATE 0.12% ORAL RINSE 15 ML: 1.2 LIQUID ORAL at 08:20

## 2023-05-16 RX ADMIN — APIXABAN 10 MG: 5 TABLET, FILM COATED ORAL at 19:09

## 2023-05-16 RX ADMIN — DILTIAZEM HYDROCHLORIDE 360 MG: 120 CAPSULE, COATED, EXTENDED RELEASE ORAL at 13:17

## 2023-05-16 RX ADMIN — CALCIUM GLUCONATE 2 G: 20 INJECTION, SOLUTION INTRAVENOUS at 07:47

## 2023-05-16 RX ADMIN — HEPARIN SODIUM 13 UNITS/KG/HR: 10000 INJECTION, SOLUTION INTRAVENOUS at 13:19

## 2023-05-16 RX ADMIN — HEPARIN SODIUM 13 UNITS/KG/HR: 10000 INJECTION, SOLUTION INTRAVENOUS at 03:34

## 2023-05-16 RX ADMIN — ATORVASTATIN CALCIUM 20 MG: 10 TABLET, FILM COATED ORAL at 08:20

## 2023-05-16 RX ADMIN — HEPARIN SODIUM 4600 UNITS: 1000 INJECTION INTRAVENOUS; SUBCUTANEOUS at 16:19

## 2023-05-16 NOTE — PLAN OF CARE
Problem: MOBILITY - ADULT  Goal: Maintain or return to baseline ADL function  Description: INTERVENTIONS:  -  Assess patient's ability to carry out ADLs; assess patient's baseline for ADL function and identify physical deficits which impact ability to perform ADLs (bathing, care of mouth/teeth, toileting, grooming, dressing, etc )  - Assess/evaluate cause of self-care deficits   - Assess range of motion  - Assess patient's mobility; develop plan if impaired  - Assess patient's need for assistive devices and provide as appropriate  - Encourage maximum independence but intervene and supervise when necessary  - Involve family in performance of ADLs  - Assess for home care needs following discharge   - Consider OT consult to assist with ADL evaluation and planning for discharge  - Provide patient education as appropriate  Outcome: Progressing  Goal: Maintains/Returns to pre admission functional level  Description: INTERVENTIONS:  - Perform BMAT or MOVE assessment daily    - Set and communicate daily mobility goal to care team and patient/family/caregiver  - Collaborate with rehabilitation services on mobility goals if consulted  - Perform Range of Motion 3 times a day  - Reposition patient every 2 hours    - Dangle patient 1 times a day  - Stand patient 4 times a day  - Ambulate patient 2 times a day  - Out of bed to chair 1 times a day   - Out of bed for meals 2 times a day  - Out of bed for toileting  - Record patient progress and toleration of activity level   Outcome: Progressing     Problem: PAIN - ADULT  Goal: Verbalizes/displays adequate comfort level or baseline comfort level  Description: Interventions:  - Encourage patient to monitor pain and request assistance  - Assess pain using appropriate pain scale  - Administer analgesics based on type and severity of pain and evaluate response  - Implement non-pharmacological measures as appropriate and evaluate response  - Consider cultural and social influences on pain and pain management  - Notify physician/advanced practitioner if interventions unsuccessful or patient reports new pain  Outcome: Progressing     Problem: INFECTION - ADULT  Goal: Absence or prevention of progression during hospitalization  Description: INTERVENTIONS:  - Assess and monitor for signs and symptoms of infection  - Monitor lab/diagnostic results  - Monitor all insertion sites, i e  indwelling lines, tubes, and drains  - Monitor endotracheal if appropriate and nasal secretions for changes in amount and color  - [place] appropriate cooling/warming therapies per order  - Administer medications as ordered  - Instruct and encourage patient and family to use good hand hygiene technique  - Identify and instruct in appropriate isolation precautions for identified infection/condition  Outcome: Progressing  Goal: Absence of fever/infection during neutropenic period  Description: INTERVENTIONS:  - Monitor WBC    Outcome: Progressing     Problem: SAFETY ADULT  Goal: Maintain or return to baseline ADL function  Description: INTERVENTIONS:  -  Assess patient's ability to carry out ADLs; assess patient's baseline for ADL function and identify physical deficits which impact ability to perform ADLs (bathing, care of mouth/teeth, toileting, grooming, dressing, etc )  - Assess/evaluate cause of self-care deficits   - Assess range of motion  - Assess patient's mobility; develop plan if impaired  - Assess patient's need for assistive devices and provide as appropriate  - Encourage maximum independence but intervene and supervise when necessary  - Involve family in performance of ADLs  - Assess for home care needs following discharge   - Consider OT consult to assist with ADL evaluation and planning for discharge  - Provide patient education as appropriate  Outcome: Progressing  Goal: Maintains/Returns to pre admission functional level  Description: INTERVENTIONS:  - Perform BMAT or MOVE assessment daily    - Set and communicate daily mobility goal to care team and patient/family/caregiver  - Collaborate with rehabilitation services on mobility goals if consulted  - Perform Range of Motion 3 times a day  - Reposition patient every 2 hours    - Dangle patient 1 times a day  - Stand patient 4 times a day  - Ambulate patient 2 times a day  - Out of bed to chair 1 times a day   - Out of bed for meals 2 times a day  - Out of bed for toileting  - Record patient progress and toleration of activity level   Outcome: Progressing  Goal: Patient will remain free of falls  Description: INTERVENTIONS:  - Educate patient/family on patient safety including physical limitations  - Instruct patient to call for assistance with activity   - Consult OT/PT to assist with strengthening/mobility   - Keep Call bell within reach  - Keep bed low and locked with side rails adjusted as appropriate  - Keep care items and personal belongings within reach  - Initiate and maintain comfort rounds  - Make Fall Risk Sign visible to staff  - Offer Toileting every 2 Hours, in advance of need  - Initiate/Maintain alarm  - Obtain necessary fall risk management equipment:   - Apply yellow socks and bracelet for high fall risk patients  - Consider moving patient to room near nurses station  Outcome: Progressing     Problem: DISCHARGE PLANNING  Goal: Discharge to home or other facility with appropriate resources  Description: INTERVENTIONS:  - Identify barriers to discharge w/patient and caregiver  - Arrange for needed discharge resources and transportation as appropriate  - Identify discharge learning needs (meds, wound care, etc )  - Arrange for interpretive services to assist at discharge as needed  - Refer to Case Management Department for coordinating discharge planning if the patient needs post-hospital services based on physician/advanced practitioner order or complex needs related to functional status, cognitive ability, or social support system  Outcome: Progressing     Problem: Knowledge Deficit  Goal: Patient/family/caregiver demonstrates understanding of disease process, treatment plan, medications, and discharge instructions  Description: Complete learning assessment and assess knowledge base    Interventions:  - Provide teaching at level of understanding  - Provide teaching via preferred learning methods  Outcome: Progressing     Problem: RESPIRATORY - ADULT  Goal: Achieves optimal ventilation and oxygenation  Description: INTERVENTIONS:  - Assess for changes in respiratory status  - Assess for changes in mentation and behavior  - Position to facilitate oxygenation and minimize respiratory effort  - Oxygen administered by appropriate delivery if ordered  - Initiate smoking cessation education as indicated  - Encourage broncho-pulmonary hygiene including cough, deep breathe, Incentive Spirometry  - Assess the need for suctioning and aspirate as needed  - Assess and instruct to report SOB or any respiratory difficulty  - Respiratory Therapy support as indicated  Outcome: Progressing     Problem: HEMATOLOGIC - ADULT  Goal: Maintains hematologic stability  Description: INTERVENTIONS  - Assess for signs and symptoms of bleeding or hemorrhage  - Monitor labs  - Administer supportive blood products/factors as ordered and appropriate  Outcome: Progressing

## 2023-05-16 NOTE — CASE MANAGEMENT
Case Management Discharge Planning Note    Patient name Arie James  Location / MRN [id number]  : 1955 Date 2023       Current Admission Date: 5/15/2023  Current Admission Diagnosis:Pulmonary embolism with acute cor pulmonale [facility])   Patient Active Problem List    Diagnosis Date Noted   • SIRS (systemic inflammatory response syndrome) ([facility] ) 05/15/2023   • Acute respiratory failure with hypoxemia ([facility] ) 05/15/2023   • LULI (acute kidney injury) ([facility] ) 05/15/2023   • Pulmonary embolism with acute cor pulmonale ([facility] ) 05/15/2023   • HLD (hyperlipidemia) 05/15/2023   • HTN (hypertension) 05/15/2023   • Acute deep vein thrombosis (DVT) of femoral vein of right lower extremity ([facility] ) 05/15/2023      LOS (days): 1  Geometric Mean LOS (GMLOS) (days):   Days to GMLOS:     OBJECTIVE:  Risk of Unplanned Readmission Score: 8 09         Current admission status: Inpatient   Preferred Pharmacy:   PATIENT/FAMILY REPORTS NO PREFERRED PHARMACY  No address on file      [id number]- [place], [place] - [phone] ([address]) [address]  Phone: [phone] Fax: [phone]    Primary Care Provider: No primary care provider on file  Primary Insurance: [facility] REP  Secondary Insurance:     DISCHARGE DETAILS:    Other Referral/Resources/Interventions Provided:  Interventions: Prescription Price Check  Referral Comments: CM called [facility] in [facility] at [phone] [facility]  CM requested a price check on [employer] then reported that there is a $40 50 copay   CM informed ICU AP of the same via TT

## 2023-05-16 NOTE — UTILIZATION REVIEW
Initial Clinical Review    Admission: Date/Time/Statement:   Admission Orders (From admission, onward)     Ordered        05/15/23 1310  INPATIENT ADMISSION  Once                      Orders Placed This Encounter   Procedures   • INPATIENT ADMISSION     Standing Status:   Standing     Number of Occurrences:   1     Order Specific Question:   Level of Care     Answer:   Level 2 Stepdown / HOT [14]     Order Specific Question:   Estimated length of stay     Answer:   More than 2 Midnights     Order Specific Question:   Certification     Answer:   I certify that inpatient services are medically necessary for this patient for a duration of greater than two midnights  See H&P and MD Progress Notes for additional information about the patient's course of treatment  ED Arrival Information     Expected   -    Arrival   5/15/2023 09:51    Acuity   Emergent            Means of arrival   Ambulance    Escorted by   [facility]    Service   Critical Care/ICU    Admission type   Emergency            Arrival complaint   SOB           Chief Complaint   Patient presents with   • Shortness of Breath     Sent by pcp due to sob, 85% RA, no home 02 hx, productive cough recently         Initial Presentation: 77 y o  male to the ED from PCP  via EMS with complaints of shortness of breath, hypoxic  Admitted to CRITICAL CARE step down for PE with acute cor pulmonale, acute respiratory failure with hypoxemia  H/O HTN HLD, obesity, active tobacco use  Arrives tachycardic, tachypnea  Troponin elevated  Lactic acid 2 3  CTA chest shows: Extensive bilateral PE with elevation RV/LV diameter ration indication right heart strain  Started on IV heparin in the ED  Check venous duplex  Cardiology consult  Pulse ox 85 % on room air  Placed on 4LNC  Started iv steroids, IV abx, nebulizers  Check ECHo  Date: 5/16   Day 2:   ECHO confirms right heart strain  Case management consult for po anticoag at home  Blood cultures pending    Hold abx   Continue iv heparin  Continue IV steroids  LUngs clear  RLE edema  Dopplers neg for acute dvt  Counseled about smoking cessation         ED Triage Vitals   Temperature Pulse Respirations Blood Pressure SpO2   05/15/23 1004 05/15/23 0957 05/15/23 0957 05/15/23 0957 05/15/23 0957   97 6 °F (36 4 °C) (!) 131 22 119/72 93 %      Temp Source Heart Rate Source Patient Position - Orthostatic VS BP Location FiO2 (%)   05/15/23 1004 05/15/23 1030 05/15/23 1330 05/15/23 1330 --   Oral Monitor Lying Right arm       Pain Score       05/15/23 1520       No Pain          Wt Readings from Last 1 Encounters:   05/15/23 120 kg (264 lb 1 8 oz)     Additional Vital Signs:   /Time Temp Pulse Resp BP MAP (mmHg) SpO2 Calculated FIO2 (%) - Nasal Cannula Nasal Cannula O2 Flow Rate (L/min) O2 Device Patient Position - Orthostatic VS   05/16/23 1100 97 7 °F (36 5 °C) 98 16 124/79 97 95 % -- -- -- Lying   05/16/23 0700 97 5 °F (36 4 °C) 85 19 119/76 93 97 % -- -- -- Lying   05/16/23 0535 -- 84 16 118/66 87 95 % -- -- -- --   05/15/23 2300 97 5 °F (36 4 °C) 96 26 Abnormal  128/77 96 94 % -- -- -- Lying   05/15/23 2000 -- 96 22 127/71 95 92 % -- -- -- --   05/15/23 1900 97 7 °F (36 5 °C) 96 25 Abnormal  127/71 95 94 % -- -- -- Lying   05/15/23 1500 98 2 °F (36 8 °C) 107 Abnormal  20 122/76 96 88 % Abnormal  -- -- -- --   05/15/23 1415 -- 105 18 110/62 79 93 % 32 3 L/min Nasal cannula Lying   05/15/23 1400 -- 108 Abnormal  18 -- -- 92 % 28 2 L/min Nasal cannula --   05/15/23 1330 -- 104 18 -- -- 93 % 28 2 L/min Nasal cannula Lying   05/15/23 1200 -- 104 16 -- -- 92 % 28 2 L/min Nasal cannula --   05/15/23 1100 -- 109 Abnormal  18 105/60 76 99 % 28 2 L/min Nasal cannula --   05/15/23 1030 -- 118 Abnormal  14 104/61 74 94 % -- -- -- --   05/15/23 1004 97 6 °F (36 4 °C) -- -- -- -- -- -- -- -- --   05/15/23 0957 -- 131 Abnormal  22 119/72 -- 93 % 36 4 L/min Nasal cannula        Pertinent Labs/Diagnostic Test Results:   5/15 EKG: Sinus tachycardia  Right bundle branch block  Abnormal ECG  No previous ECGs available  5/15 ECHO: Technically difficult study  •  Left Ventricle: Left ventricular cavity size is normal  Wall thickness is normal  Systolic function is normal (60%)  Wall motion cannot be accurately assessed  •  Right Ventricle: Right ventricular cavity size is dilated  Systolic function is normal   •  Right Atrium: The atrium is mildly dilated  •  Tricuspid Valve: There is mild regurgitation  The right ventricular systolic pressure is moderately to severely elevated (68 mm Hg)  •  IVC/SVC: The inferior vena cava is dilated  Respirophasic changes were blunted (less than 50% variation)  RA pressure 15 mm Hg    VAS lower limb venous duplex study, complete bilateral   Final Result by Sparkle Heck DO (05/15 1486)  Impression:  RIGHT LOWER LIMB:  Evidence suggestive of Acute occlusive deep vein thrombosis noted in the  proximal-distal superficial femoral, popliteal, gastrocnemius, posterior tibial,  and peroneal veins  No evidence of superficial thrombophlebitis noted  Doppler evaluation shows a normal response to augmentation maneuvers     Popliteal, posterior tibial and anterior tibial arterial Doppler waveform's are  triphasic  LEFT LOWER LIMB:  No evidence of acute or chronic deep vein thrombosis  No evidence of superficial thrombophlebitis noted  Doppler evaluation shows a normal response to augmentation maneuvers  Popliteal, posterior tibial and anterior tibial arterial Doppler waveform's are  triphasic  CTA ED chest PE Study   Final Result by Keke Macias MD (05/15 2506)      Extensive bilateral pulmonary emboli, greater on the right, with near complete occlusion of the right lower lobe pulmonary arteries with saddle embolus  Elevation of the RV/LV diameter ratio at 1 6 indicating right heart strain        Mild peripheral groundglass opacity in the right upper lobe which could be infectious/inflammatory or could be a pulmonary infarct  Large hiatal hernia  I personally discussed this study with Dr Elvera Cabot on 5/15/2023 11:04 AM             Workstation performed: [id number]         XR chest 2 views   Final Result by Isabel Kowalski MD (05/15 1422)      No acute cardiopulmonary disease                    Workstation performed: [id number]               Results from last 7 days   Lab Units 05/16/23  0529 05/15/23  1002   WBC Thousand/uL 14 11* 11 08*   HEMOGLOBIN g/dL 13 9 15 6   HEMATOCRIT % 41 9 48 1   PLATELETS Thousands/uL 268 320   NEUTROS ABS Thousands/µL 12 77* 8 39*         Results from last 7 days   Lab Units 05/16/23  0529 05/15/23  1003   SODIUM mmol/L 138 140   POTASSIUM mmol/L 4 5 4 3   CHLORIDE mmol/L 109* 106   CO2 mmol/L 23 26   ANION GAP mmol/L 6 8   BUN mg/dL 20 21   CREATININE mg/dL 0 88 1 36*   EGFR ml/min/1 73sq m 87 52   CALCIUM mg/dL 8 6 9 5   CALCIUM, IONIZED mmol/L 1 07*  --    MAGNESIUM mg/dL 2 2  --    PHOSPHORUS mg/dL 3 5  --      Results from last 7 days   Lab Units 05/16/23  0529 05/15/23  1003   AST U/L 21 22   ALT U/L 16 21   ALK PHOS U/L 82 100   TOTAL PROTEIN g/dL 6 3* 7 6   ALBUMIN g/dL 3 4* 4 0   TOTAL BILIRUBIN mg/dL 0 50 0 64         Results from last 7 days   Lab Units 05/16/23  0529 05/15/23  1003   GLUCOSE RANDOM mg/dL 137 137         Results from last 7 days   Lab Units 05/15/23  1413 05/15/23  1226 05/15/23  1013   HS TNI 0HR ng/L  --   --  34   HS TNI 2HR ng/L  --  97*  --    HSTNI D2 ng/L  --  63*  --    HS TNI 4HR ng/L 75*  --   --    HSTNI D4 ng/L 41*  --   --        Results from last 7 days   Lab Units 05/16/23  0755 05/16/23  0121 05/15/23  1844 05/15/23  1003   PROTIME seconds  --   --   --  12 6   INR   --   --   --  0 96   PTT seconds 89* 106* 194* 28       Results from last 7 days   Lab Units 05/15/23  1003   PROCALCITONIN ng/ml 0 08     Results from last 7 days   Lab Units 05/15/23  1226 05/15/23  1003   LACTIC ACID mmol/L 1 4 2 3*       Results from last 7 days   Lab Units 05/15/23  1002   BNP pg/mL 28       Results from last 7 days   Lab Units 05/15/23  1357   CLARITY UA  Clear   COLOR UA  Yellow   SPEC GRAV UA  1 035*   PH UA  5 5   GLUCOSE UA mg/dl Negative   KETONES UA mg/dl Trace*   BLOOD UA  Negative   PROTEIN UA mg/dl Negative   NITRITE UA  Negative   BILIRUBIN UA  Negative   UROBILINOGEN UA (BE) mg/dl <2 0   LEUKOCYTES UA  Negative             Results from last 7 days   Lab Units 05/15/23  1003 05/15/23  1002   BLOOD CULTURE  Received in Microbiology Lab  Culture in Progress  Received in Microbiology Lab  Culture in Progress                 ED Treatment:   Medication Administration from 05/15/2023 0950 to 05/15/2023 1454       Date/Time Order Dose Route Action     05/15/2023 1047 EDT ceftriaxone (ROCEPHIN) 1 g/50 mL in dextrose IVPB 1,000 mg Intravenous New Bag     05/15/2023 1044 EDT methylPREDNISolone sodium succinate (Solu-MEDROL) injection 60 mg 60 mg Intravenous Given     05/15/2023 1047 EDT albuterol inhalation solution 5 mg 5 mg Nebulization Given     05/15/2023 1047 EDT ipratropium (ATROVENT) 0 02 % inhalation solution 0 5 mg 0 5 mg Nebulization Given     05/15/2023 1237 EDT heparin (porcine) injection 9,200 Units 9,200 Units Intravenous Given     05/15/2023 1242 EDT heparin (porcine) 25,000 units in 0 45% NaCl 250 mL infusion (premix) 18 Units/kg/hr Intravenous New Bag        Past Medical History:   Diagnosis Date   • High cholesterol    • Hypertension      Present on Admission:  • SIRS (systemic inflammatory response syndrome) ([facility]  • Acute respiratory failure with hypoxemia ([facility])  • LULI (acute kidney injury) ([facility] )  • Pulmonary embolism with acute cor pulmonale ([facility])  • HLD (hyperlipidemia)  • HTN (hypertension)  • Acute deep vein thrombosis (DVT) of femoral vein of right lower extremity ([facility])      Admitting Diagnosis: SOB (shortness of breath) [R06 02]  Hypoxia [R09 02]  Pulmonary emboli ([facility] ) [I26 99]  Age/Sex: 68 y o  male  Admission Orders:  Scheduled Medications:  atorvastatin, 20 mg, Oral, Daily  chlorhexidine, 15 mL, Mouth/Throat, Q12H [address]      Continuous IV Infusions:  heparin (porcine), 3-30 Units/kg/hr (Order-Specific), Intravenous, Titrated      PRN Meds:  heparin (porcine), 4,600 Units, Intravenous, Q6H PRN  heparin (porcine), 9,200 Units, Intravenous, Q6H PRN        IP CONSULT TO CASE MANAGEMENT    Network Utilization Review Department  ATTENTION: Please call with any questions or concerns to [phone] and carefully listen to the prompts so that you are directed to the right person  All voicemails are confidential   George Duck all requests for admission clinical reviews, approved or denied determinations and any other requests to dedicated fax number below belonging to the campus where the patient is receiving treatment   List of dedicated fax numbers for the Facilities:  [address] DENIALS (Administrative/Medical Necessity) [phone]   [address] (Maternity/NICU/Pediatrics) [phone]   [address] [phone]   [place] [address] [phone]   [address] [place] [address] [address] Gal[address] [phone]   [address] [place] [address] 815 [facility] [phone]

## 2023-05-16 NOTE — PROGRESS NOTES
[address] [place] [address]  Progress Note  Name: Td Jennings  MRN: [id number]  Unit/Bed#:  I Date of Admission: 5/15/2023   Date of Service: 5/16/2023 I Hospital Day: 1    Assessment/Plan   * Pulmonary embolism with acute cor pulmonale (HCC)  Assessment & Plan  · Continue heparin infusion for now  · ECHO completed with evidence of right heart strain  · Discuss with case management/cardiology oral anticoagulation  · Pending lower extremity duplex    Acute respiratory failure with hypoxemia (HCC)  Assessment & Plan  · Continue oxygen supplementation for goal SpO2>94%  · Encourage good pulmonary hygiene  · Respiratory protocol         SIRS (systemic inflammatory response syndrome) ([facility] )  Assessment & Plan  · Likely in the setting of acute PE with right heart strain  · Hold on antibiotics  · Cultures pending  · Continue telemetry and supplemental O2    LULI (acute kidney injury) ([facility] )  Assessment & Plan  · Unknown baseline  · Close intake and output  · Daily weights  · Trend serum creatinine    Acute deep vein thrombosis (DVT) of femoral vein of right lower extremity (HCC)  Assessment & Plan  · Continue heparin infusion  · Discuss with case management/cardiology conversion to oral anticoagulation    HTN (hypertension)  Assessment & Plan  · Currently normotensive, Hold Lisinopril in setting of LULI and acute pulmonary emboli    HLD (hyperlipidemia)  Assessment & Plan  · Continue Lipitor         ----------------------------------------------------------------------------------------  HPI/24hr events: Patient's lower extremity duplex notable for right lower extremity DVT, oxygen needs improved    Patient appropriate for transfer out of the ICU today?: Patient does not meet criteria for referral to the ICU Follow-Up Clinic; referral has not been made     Disposition: Transfer to Med-Surg   Code Status: Level 1 - Full "Code  ---------------------------------------------------------------------------------------  SUBJECTIVE  \"My breathing is a little better\"    Review of Systems   Constitutional: Positive for fatigue  Respiratory: Positive for shortness of breath (improving)  Cardiovascular: Negative for chest pain  Gastrointestinal: Negative for nausea and vomiting  Musculoskeletal: Negative for arthralgias and back pain  Neurological: Negative for weakness  ---------------------------------------------------------------------------------------  OBJECTIVE    Vitals   Vitals:    05/15/23 1500 05/15/23 1900 05/15/23 2000 05/15/23 2300   BP: 122/76 127/71 127/71 128/77   BP Location:  Right arm  Right arm   Pulse: (!) 107 96 96 96   Resp: 20 (!) 25 22 (!) 26   Temp: 98 2 °F (36 8 °C) 97 7 °F (36 5 °C)  97 5 °F (36 4 °C)   TempSrc: Oral Oral  Oral   SpO2: (!) 88% 94% 92% 94%   Weight: 120 kg (264 lb 1 8 oz)      Height: 5' 10\" (1 778 m)        Temp (24hrs), Av 8 °F (36 6 °C), Min:97 5 °F (36 4 °C), Max:98 2 °F (36 8 °C)  Current: Temperature: 97 5 °F (36 4 °C)          Respiratory:  SpO2: SpO2: 94 %, SpO2 Activity: SpO2 Activity: At Rest, SpO2 Device: O2 Device: Nasal cannula  Nasal Cannula O2 Flow Rate (L/min): 3 L/min    Invasive/non-invasive ventilation settings   Respiratory    Lab Data (Last 4 hours)    None         O2/Vent Data (Last 4 hours)    None                Physical Exam  Constitutional:       Appearance: He is not ill-appearing  Interventions: Nasal cannula in place  HENT:      Head: Normocephalic and atraumatic  Mouth/Throat:      Mouth: Mucous membranes are dry  Eyes:      Pupils: Pupils are equal, round, and reactive to light  Cardiovascular:      Rate and Rhythm: Normal rate and regular rhythm  Pulmonary:      Effort: Pulmonary effort is normal  No respiratory distress  Breath sounds: Normal breath sounds  Abdominal:      General: There is no distension        Palpations: " "Abdomen is soft  Tenderness: There is no abdominal tenderness  Musculoskeletal:      Right lower leg: Edema present  Left lower leg: No edema  Neurological:      Mental Status: He is alert  GCS: GCS eye subscore is 4  GCS verbal subscore is 5  GCS motor subscore is 6  Laboratory and Diagnostics:  Results from last 7 days   Lab Units 05/15/23  1002   WBC Thousand/uL 11 08*   HEMOGLOBIN g/dL 15 6   HEMATOCRIT % 48 1   PLATELETS Thousands/uL 320   NEUTROS PCT % 75   MONOS PCT % 7     Results from last 7 days   Lab Units 05/15/23  1003   SODIUM mmol/L 140   POTASSIUM mmol/L 4 3   CHLORIDE mmol/L 106   CO2 mmol/L 26   ANION GAP mmol/L 8   BUN mg/dL 21   CREATININE mg/dL 1 36*   CALCIUM mg/dL 9 5   GLUCOSE RANDOM mg/dL 137   ALT U/L 21   AST U/L 22   ALK PHOS U/L 100   ALBUMIN g/dL 4 0   TOTAL BILIRUBIN mg/dL 0 64          Results from last 7 days   Lab Units 05/15/23  1844 05/15/23  1003   INR   --  0 96   PTT seconds 194* 28          Results from last 7 days   Lab Units 05/15/23  1226 05/15/23  1003   LACTIC ACID mmol/L 1 4 2 3*     ABG:    VBG:    Results from last 7 days   Lab Units 05/15/23  1003   PROCALCITONIN ng/ml 0 08       Micro  Results from last 7 days   Lab Units 05/15/23  1003 05/15/23  1002   BLOOD CULTURE  Received in Microbiology Lab  Culture in Progress  Received in Microbiology Lab  Culture in Progress  EKG: Sinus rhythm  Imaging: I have personally reviewed pertinent reports  and I have personally reviewed pertinent films in PACS    Intake and Output  I/O       05/14 0701  05/15 0700 05/15 0701  05/16 0700    P  O   480    I V  (mL/kg)  200 7 (1 7)    IV Piggyback  50    Total Intake(mL/kg)  730 7 (6 1)    Net  +730 7                Height and Weights   Height: 5' 10\" (177 8 cm)  IBW (Ideal Body Weight): 73 kg  Body mass index is 37 9 kg/m²    Weight (last 2 days)     Date/Time Weight    05/15/23 1500 120 (264 11)    05/15/23 1100 119 (263)    05/15/23 1004 119 (263)    " "        Nutrition       Diet Orders   (From admission, onward)             Start     Ordered    05/15/23 1329  Diet Cardiovascular; Cardiac  Diet effective now        References:    Adult Nutrition Support Algorithm    RD Therapeutic Diet Order Protocol   Question Answer Comment   Diet Type Cardiovascular    Cardiac Cardiac    RD to adjust diet per protocol? Yes        05/15/23 1334                  Active Medications  Scheduled Meds:  Current Facility-Administered Medications   Medication Dose Route Frequency Provider Last Rate   • atorvastatin  20 mg Oral Daily IVANNA Reed     • chlorhexidine  15 mL Mouth/Throat Q12H [facility] & intermediate IVANNA Pandya     • heparin (porcine)  3-30 Units/kg/hr (Order-Specific) Intravenous Titrated Ghada Hicks MD 15 Units/kg/hr (05/15/23 2020)   • heparin (porcine)  4,600 Units Intravenous Q6H PRN Ghada Hicks MD     • heparin (porcine)  9,200 Units Intravenous Q6H PRN Ghada Hicks MD       Continuous Infusions:  heparin (porcine), 3-30 Units/kg/hr (Order-Specific), Last Rate: 15 Units/kg/hr (05/15/23 2020)      PRN Meds:   heparin (porcine), 4,600 Units, Q6H PRN  heparin (porcine), 9,200 Units, Q6H PRN        Invasive Devices Review  Invasive Devices     Peripheral Intravenous Line  Duration           Peripheral IV 05/15/23 Distal;Right;Upper;Ventral (anterior) Arm <1 day    Peripheral IV 05/15/23 Dorsal (posterior); Right Hand <1 day                Rationale for remaining devices: IV access  ---------------------------------------------------------------------------------------  Advance Directive and Living Will:      Power of :    POLST:    ---------------------------------------------------------------------------------------  Care Time Delivered:   No Critical Care time spent       [facility]      Portions of the record may have been created with voice recognition software    Occasional wrong word or \"sound a like\" substitutions may have occurred " due to the inherent limitations of voice recognition software    Read the chart carefully and recognize, using context, where substitutions have occurred

## 2023-05-16 NOTE — DISCHARGE SUMMARY
"  Discharge Summary - Niki Tyler 77 y o  male MRN: [id number]    Unit/Bed#:  Encounter: [id number]    Admission Date:   Admission Orders (From admission, onward)     Ordered        05/15/23 1310  INPATIENT ADMISSION  Once                        Admitting Diagnosis: SOB (shortness of breath) [R06 02]  Hypoxia [R09 02]  Pulmonary emboli ([address] ) [I26 99]    HPI: Niki Tyler is a 77 y o  who presents from PCP w/ acute SOB  Pt reports 3 weeks of cough, but today he awoke to feeling as though he \"counld not catch his breath  \" He presented to his PCP for eval and was referred to ED for hypoxia and respiratory distress  In the ED he was hypoxic @ 85% on RA  He was placed on 4L NC, given neb treatments, IV solumedrol, & Rocephin  He met SIRS criteria AEB tachypnea, tachycardia, hypoxia, as well as an LULI and lactic acidosis  PMH significant for HTN & HLD  PE study notable extensive PE, right greater than left with near complete occlusion of the right lower lobe pulmonary arteries with saddle emboli, LV/RV ratio 1 6  Started on heparin gtt  Transferred to ICU as SD level of care for further management    Procedures Performed:   Orders Placed This Encounter   Procedures   • Critical Care   • ED ECG Documentation Only       Summary of Hospital Course: Niki Tyler presented on 5/15/2023 with acute respiratory failure with hypoxia  Imaging revealed extensive bilateral pulmonary emboli and near occlusion of the right lower lobe pulmonary arteries with saddle embolus, and right heart strain with an LV/RV ratio of 1 6  On arrival to the ED he was hypoxic and short of breath with an oxygen saturation of 85%, placed on 4 L nasal cannula  Received breathing treatments and IV steroids as well as antibiotics  He was maintained on a heparin infusion overnight  Echo with EF of 60%, RV/IVC/right atrium dilation, tricuspid regurgitation, RVSP 68    Bilateral lower extremity duplex revealed right lower extremity acute " occlusion of the proximal-distal superficial femoral, popliteal, gastrocnemius, posterior tibial, peritoneal vein  Patient counseled about the importance of daily mobility in the setting of his admittedly sedentary lifestyle  Also did discuss the possibility of a long-term DOAC  The patient will continue Eliquis twice daily and follow-up with PCP  Significant Findings, Care, Treatment and Services Provided:   CTA PE study:   Extensive bilateral pulmonary emboli, greater on the right, with near complete occlusion of the right lower lobe pulmonary arteries with saddle embolus  Elevation of the RV/LV diameter ratio at 1 6 indicating right heart strain  Mild peripheral groundglass opacity in the right upper lobe which could be infectious/inflammatory or could be a pulmonary infarct  Large hiatal hernia  ECHO: Left Ventricle: Left ventricular cavity size is normal  Wall thickness is normal  Systolic function is normal (60%)  Wall motion cannot be accurately assessed  •  Right Ventricle: Right ventricular cavity size is dilated  Systolic function is normal   •  Right Atrium: The atrium is mildly dilated  •  Tricuspid Valve: There is mild regurgitation  The right ventricular systolic pressure is moderately to severely elevated (68 mm Hg)  •  IVC/SVC: The inferior vena cava is dilated  Respirophasic changes were blunted (less than 50% variation)  RA pressure 15 mm Hg  Venous duplex BL:   RIGHT LOWER LIMB:  Evidence suggestive of Acute occlusive deep vein thrombosis noted in the  proximal-distal superficial femoral, popliteal, gastrocnemius, posterior tibial,  and peroneal veins  No evidence of superficial thrombophlebitis noted  Doppler evaluation shows a normal response to augmentation maneuvers     Popliteal, posterior tibial and anterior tibial arterial Doppler waveform's are  triphasic  LEFT LOWER LIMB:  No evidence of acute or chronic deep vein thrombosis    No evidence of superficial thrombophlebitis noted  Doppler evaluation shows a normal response to augmentation maneuvers  Popliteal, posterior tibial and anterior tibial arterial Doppler waveform's are  triphasic  Complications: none    Discharge Diagnosis: Pulmonary embolism w/ acute cor pulmonale    Medical Problems     Resolved Problems  Date Reviewed: 5/16/2023          Resolved    Lactic acidosis 5/15/2023     Resolved by  IVANNA Hernandez          Condition at Discharge: good         Discharge instructions/Information to patient and family:   See after visit summary for information provided to patient and family  Provisions for Follow-Up Care:  See after visit summary for information related to follow-up care and any pertinent home health orders  PCP: No primary care provider on file  Disposition: Home    Planned Readmission: No      Discharge Statement   I spent 30 minutes discharging the patient  This time was spent on the day of discharge  I had direct contact with the patient on the day of discharge  Additional documentation is required if more than 30 minutes were spent on discharge  Discharge Medications:  See after visit summary for reconciled discharge medications provided to patient and family

## 2023-05-16 NOTE — INCIDENTAL FINDINGS
The following findings require follow up:  Radiographic finding   Finding: Extensive bilateral pulmonary emboli, greater on the right, with near complete occlusion of the right lower lobe pulmonary arteries with saddle embolus      Elevation of the RV/LV diameter ratio at 1 6 indicating right heart strain      Mild peripheral groundglass opacity in the right upper lobe which could be infectious/inflammatory or could be a pulmonary infarct   Follow up required: CT Chest   Follow up should be done within 6  month(s)    Please notify the following clinician to assist with the follow up:   Dr Pardeep Winston

## 2023-05-16 NOTE — PROGRESS NOTES
Patient seen and evaluated by Critical Care today and deemed to be appropriate for transfer to Med Surg with Telemetry   Spoke to Dr Dennis Nguyễn from [facility] to accept patient transfer  Patient did not move from ICU on the day of transfer  See progress note from 5/16/23 for the most up-to-date treatment therapy plans  Critical care can be contacted via [employer] with any questions or concerns

## 2023-05-17 LAB
ALBUMIN SERPL BCP-MCNC: 3.3 G/DL (ref 3.5–5)
ALP SERPL-CCNC: 75 U/L (ref 34–104)
ALT SERPL W P-5'-P-CCNC: 19 U/L (ref 7–52)
ANION GAP SERPL CALCULATED.3IONS-SCNC: 5 MMOL/L (ref 4–13)
AST SERPL W P-5'-P-CCNC: 22 U/L (ref 13–39)
BASOPHILS # BLD AUTO: 0.07 THOUSANDS/ÂΜL (ref 0–0.1)
BASOPHILS NFR BLD AUTO: 1 % (ref 0–1)
BILIRUB SERPL-MCNC: 0.49 MG/DL (ref 0.2–1)
BUN SERPL-MCNC: 26 MG/DL (ref 5–25)
CA-I BLD-SCNC: 1.08 MMOL/L (ref 1.12–1.32)
CALCIUM ALBUM COR SERPL-MCNC: 9.5 MG/DL (ref 8.3–10.1)
CALCIUM SERPL-MCNC: 8.9 MG/DL (ref 8.4–10.2)
CHLORIDE SERPL-SCNC: 107 MMOL/L (ref 96–108)
CO2 SERPL-SCNC: 25 MMOL/L (ref 21–32)
CREAT SERPL-MCNC: 0.96 MG/DL (ref 0.6–1.3)
EOSINOPHIL # BLD AUTO: 0.06 THOUSAND/ÂΜL (ref 0–0.61)
EOSINOPHIL NFR BLD AUTO: 1 % (ref 0–6)
ERYTHROCYTE [DISTWIDTH] IN BLOOD BY AUTOMATED COUNT: 13.8 % (ref 11.6–15.1)
GFR SERPL CREATININE-BSD FRML MDRD: 79 ML/MIN/1.73SQ M
GLUCOSE SERPL-MCNC: 96 MG/DL (ref 65–140)
HCT VFR BLD AUTO: 40.7 % (ref 36.5–49.3)
HGB BLD-MCNC: 13.6 G/DL (ref 12–17)
IMM GRANULOCYTES # BLD AUTO: 0.06 THOUSAND/UL (ref 0–0.2)
IMM GRANULOCYTES NFR BLD AUTO: 1 % (ref 0–2)
LYMPHOCYTES # BLD AUTO: 1.9 THOUSANDS/ÂΜL (ref 0.6–4.47)
LYMPHOCYTES NFR BLD AUTO: 16 % (ref 14–44)
MAGNESIUM SERPL-MCNC: 2.1 MG/DL (ref 1.9–2.7)
MCH RBC QN AUTO: 30.2 PG (ref 26.8–34.3)
MCHC RBC AUTO-ENTMCNC: 33.4 G/DL (ref 31.4–37.4)
MCV RBC AUTO: 90 FL (ref 82–98)
MONOCYTES # BLD AUTO: 0.92 THOUSAND/ÂΜL (ref 0.17–1.22)
MONOCYTES NFR BLD AUTO: 8 % (ref 4–12)
NEUTROPHILS # BLD AUTO: 8.98 THOUSANDS/ÂΜL (ref 1.85–7.62)
NEUTS SEG NFR BLD AUTO: 73 % (ref 43–75)
NRBC BLD AUTO-RTO: 0 /100 WBCS
PHOSPHATE SERPL-MCNC: 3.2 MG/DL (ref 2.3–4.1)
PLATELET # BLD AUTO: 245 THOUSANDS/UL (ref 149–390)
PMV BLD AUTO: 8.8 FL (ref 8.9–12.7)
POTASSIUM SERPL-SCNC: 4.4 MMOL/L (ref 3.5–5.3)
PROT SERPL-MCNC: 5.9 G/DL (ref 6.4–8.4)
RBC # BLD AUTO: 4.51 MILLION/UL (ref 3.88–5.62)
SODIUM SERPL-SCNC: 137 MMOL/L (ref 135–147)
WBC # BLD AUTO: 11.99 THOUSAND/UL (ref 4.31–10.16)

## 2023-05-17 RX ORDER — CALCIUM GLUCONATE 20 MG/ML
2 INJECTION, SOLUTION INTRAVENOUS ONCE
Status: COMPLETED | OUTPATIENT
Start: 2023-05-17 | End: 2023-05-17

## 2023-05-17 RX ADMIN — DILTIAZEM HYDROCHLORIDE 360 MG: 120 CAPSULE, COATED, EXTENDED RELEASE ORAL at 09:17

## 2023-05-17 RX ADMIN — APIXABAN 10 MG: 5 TABLET, FILM COATED ORAL at 19:42

## 2023-05-17 RX ADMIN — CALCIUM GLUCONATE 2 G: 20 INJECTION, SOLUTION INTRAVENOUS at 09:20

## 2023-05-17 RX ADMIN — LISINOPRIL 5 MG: 5 TABLET ORAL at 09:17

## 2023-05-17 RX ADMIN — CHLORHEXIDINE GLUCONATE 0.12% ORAL RINSE 15 ML: 1.2 LIQUID ORAL at 09:17

## 2023-05-17 RX ADMIN — ATORVASTATIN CALCIUM 20 MG: 10 TABLET, FILM COATED ORAL at 09:17

## 2023-05-17 RX ADMIN — APIXABAN 10 MG: 5 TABLET, FILM COATED ORAL at 09:17

## 2023-05-17 RX ADMIN — CHLORHEXIDINE GLUCONATE 0.12% ORAL RINSE 15 ML: 1.2 LIQUID ORAL at 22:12

## 2023-05-17 NOTE — ASSESSMENT & PLAN NOTE
• Likely in the setting of acute PE with right heart strain  • Hold on antibiotics  • Cultures negative to date  • Continue telemetry and supplemental O2

## 2023-05-17 NOTE — PLAN OF CARE
Problem: MOBILITY - ADULT  Goal: Maintain or return to baseline ADL function  Description: INTERVENTIONS:  -  Assess patient's ability to carry out ADLs; assess patient's baseline for ADL function and identify physical deficits which impact ability to perform ADLs (bathing, care of mouth/teeth, toileting, grooming, dressing, etc )  - Assess/evaluate cause of self-care deficits   - Assess range of motion  - Assess patient's mobility; develop plan if impaired  - Assess patient's need for assistive devices and provide as appropriate  - Encourage maximum independence but intervene and supervise when necessary  - Involve family in performance of ADLs  - Assess for home care needs following discharge   - Consider OT consult to assist with ADL evaluation and planning for discharge  - Provide patient education as appropriate  Outcome: Progressing  Goal: Maintains/Returns to pre admission functional level  Description: INTERVENTIONS:  - Perform BMAT or MOVE assessment daily    - Set and communicate daily mobility goal to care team and patient/family/caregiver  - Collaborate with rehabilitation services on mobility goals if consulted  - Perform Range of Motion 3 times a day  - Reposition patient every 2 hours    - Dangle patient 1 times a day  - Stand patient 4 times a day  - Ambulate patient 2 times a day  - Out of bed to chair 1 times a day   - Out of bed for meals 2 times a day  - Out of bed for toileting  - Record patient progress and toleration of activity level   Outcome: Progressing     Problem: PAIN - ADULT  Goal: Verbalizes/displays adequate comfort level or baseline comfort level  Description: Interventions:  - Encourage patient to monitor pain and request assistance  - Assess pain using appropriate pain scale  - Administer analgesics based on type and severity of pain and evaluate response  - Implement non-pharmacological measures as appropriate and evaluate response  - Consider cultural and social influences on pain and pain management  - Notify physician/advanced practitioner if interventions unsuccessful or patient reports new pain  Outcome: Progressing     Problem: INFECTION - ADULT  Goal: Absence or prevention of progression during hospitalization  Description: INTERVENTIONS:  - Assess and monitor for signs and symptoms of infection  - Monitor lab/diagnostic results  - Monitor all insertion sites, i e  indwelling lines, tubes, and drains  - Monitor endotracheal if appropriate and nasal secretions for changes in amount and color  - [place] appropriate cooling/warming therapies per order  - Administer medications as ordered  - Instruct and encourage patient and family to use good hand hygiene technique  - Identify and instruct in appropriate isolation precautions for identified infection/condition  Outcome: Progressing  Goal: Absence of fever/infection during neutropenic period  Description: INTERVENTIONS:  - Monitor WBC    Outcome: Progressing     Problem: SAFETY ADULT  Goal: Maintain or return to baseline ADL function  Description: INTERVENTIONS:  -  Assess patient's ability to carry out ADLs; assess patient's baseline for ADL function and identify physical deficits which impact ability to perform ADLs (bathing, care of mouth/teeth, toileting, grooming, dressing, etc )  - Assess/evaluate cause of self-care deficits   - Assess range of motion  - Assess patient's mobility; develop plan if impaired  - Assess patient's need for assistive devices and provide as appropriate  - Encourage maximum independence but intervene and supervise when necessary  - Involve family in performance of ADLs  - Assess for home care needs following discharge   - Consider OT consult to assist with ADL evaluation and planning for discharge  - Provide patient education as appropriate  Outcome: Progressing  Goal: Maintains/Returns to pre admission functional level  Description: INTERVENTIONS:  - Perform BMAT or MOVE assessment daily    - Set and communicate daily mobility goal to care team and patient/family/caregiver  - Collaborate with rehabilitation services on mobility goals if consulted  - Perform Range of Motion 3 times a day  - Reposition patient every 2 hours    - Dangle patient 1 times a day  - Stand patient 4 times a day  - Ambulate patient 2 times a day  - Out of bed to chair 1 times a day   - Out of bed for meals 2 times a day  - Out of bed for toileting  - Record patient progress and toleration of activity level   Outcome: Progressing  Goal: Patient will remain free of falls  Description: INTERVENTIONS:  - Educate patient/family on patient safety including physical limitations  - Instruct patient to call for assistance with activity   - Consult OT/PT to assist with strengthening/mobility   - Keep Call bell within reach  - Keep bed low and locked with side rails adjusted as appropriate  - Keep care items and personal belongings within reach  - Initiate and maintain comfort rounds  - Make Fall Risk Sign visible to staff  - Offer Toileting every 2 Hours, in advance of need  - Initiate/Maintain alarm  - Obtain necessary fall risk management equipment:   - Apply yellow socks and bracelet for high fall risk patients  - Consider moving patient to room near nurses station  Outcome: Progressing     Problem: DISCHARGE PLANNING  Goal: Discharge to home or other facility with appropriate resources  Description: INTERVENTIONS:  - Identify barriers to discharge w/patient and caregiver  - Arrange for needed discharge resources and transportation as appropriate  - Identify discharge learning needs (meds, wound care, etc )  - Arrange for interpretive services to assist at discharge as needed  - Refer to Case Management Department for coordinating discharge planning if the patient needs post-hospital services based on physician/advanced practitioner order or complex needs related to functional status, cognitive ability, or social support system  Outcome: Progressing     Problem: Knowledge Deficit  Goal: Patient/family/caregiver demonstrates understanding of disease process, treatment plan, medications, and discharge instructions  Description: Complete learning assessment and assess knowledge base    Interventions:  - Provide teaching at level of understanding  - Provide teaching via preferred learning methods  Outcome: Progressing     Problem: RESPIRATORY - ADULT  Goal: Achieves optimal ventilation and oxygenation  Description: INTERVENTIONS:  - Assess for changes in respiratory status  - Assess for changes in mentation and behavior  - Position to facilitate oxygenation and minimize respiratory effort  - Oxygen administered by appropriate delivery if ordered  - Initiate smoking cessation education as indicated  - Encourage broncho-pulmonary hygiene including cough, deep breathe, Incentive Spirometry  - Assess the need for suctioning and aspirate as needed  - Assess and instruct to report SOB or any respiratory difficulty  - Respiratory Therapy support as indicated  Outcome: Progressing     Problem: HEMATOLOGIC - ADULT  Goal: Maintains hematologic stability  Description: INTERVENTIONS  - Assess for signs and symptoms of bleeding or hemorrhage  - Monitor labs  - Administer supportive blood products/factors as ordered and appropriate  Outcome: Progressing

## 2023-05-17 NOTE — CASE MANAGEMENT
Case Management Progress Note    Patient name Lissette Santamaria  Location / MRN [id number]  : 1955 Date 2023       LOS (days): 2  Geometric Mean LOS (GMLOS) (days): 3 90  Days to GMLOS:1 9        OBJECTIVE:        Current admission status: Inpatient  Preferred Pharmacy:   PATIENT/FAMILY REPORTS NO PREFERRED PHARMACY  No address on file      [id number] [place] - [address]  [address]  [facility] [address]  Phone: [phone] Fax: [phone]    Primary Care Provider: No primary care provider on file  Primary Insurance: [facility] RE[facility]  Secondary Insurance:     PROGRESS NOTE:    Per rounding with SLIM, patient is still short of breath and was hypoxic overnight  He is anticipated to be discharged in 24 hours  No CM needs anticipated at this time  CM department will continue to follow patient through discharge

## 2023-05-17 NOTE — PROGRESS NOTES
[address] [place] [address]  Progress Note  Name: Joy Florentino  MRN: [id number]  Unit/Bed#:  I Date of Admission: 5/15/2023   Date of Service: 5/17/2023 I Hospital Day: 2    Assessment/Plan   Acute deep vein thrombosis (DVT) of femoral vein of right lower extremity ([facility] )  Assessment & Plan  Now On Eliquis, see plan as above    HTN (hypertension)  Assessment & Plan  BP stable  Continue home dose lisinopril 5 mg daily and Cardizem 360 mg daily    HLD (hyperlipidemia)  Assessment & Plan  Continue statin 20 mg daily    LULI (acute kidney injury) ([facility] )  Assessment & Plan  • Unknown baseline, resolved  • Close intake and output  • Daily weights  • Monitor with a m  BMP    Acute respiratory failure with hypoxemia (HCC)  Assessment & Plan  Continue oxygen supplementation for goal SpO2>94%, currently on room air but required 2 L overnight  Shortness of breath is improving  • Encourage good pulmonary hygiene  • Respiratory protocol       SIRS (systemic inflammatory response syndrome) ([facility])  Assessment & Plan  • Likely in the setting of acute PE with right heart strain  • Hold on antibiotics  • Cultures negative to date  • Continue telemetry and supplemental O2    * Pulmonary embolism with acute cor pulmonale (HCC)  Assessment & Plan  SOB x 3 days  CTA PE Extensive bilateral pulmonary emboli, greater on the right, with near complete occlusion of the right lower lobe pulmonary arteries with saddle embolus  Elevation of the RV/LV diameter ratio at 1 6 indicating right heart strain    • Transition from heparin drip to Eliquis  • ECHO completed with evidence of right heart strain  • Discuss with case management/cardiology oral anticoagulation  • Right lower extremity duplex Evidence suggestive of Acute occlusive deep vein thrombosis noted in the  proximal-distal superficial femoral, popliteal, gastrocnemius, posterior tibial  · Does report a sedentary lifestyle, hematology/oncology referral outpatient for hypercoagulable work-up                 VTE Pharmacologic Prophylaxis:   Moderate Risk (Score 3-4) - Pharmacological DVT Prophylaxis Ordered: apixaban (Eliquis)  Patient Centered Rounds: I performed bedside rounds with nursing staff today  Discussions with Specialists or Other Care Team Provider: SUMMER    Education and Discussions with Family / Patient: Updated  (wife) at bedside  Total Time Spent on Date of Encounter in care of patient: 55 minutes This time was spent on one or more of the following: performing physical exam; counseling and coordination of care; obtaining or reviewing history; documenting in the medical record; reviewing/ordering tests, medications or procedures; communicating with other healthcare professionals and discussing with patient's family/caregivers  Current Length of Stay: 2 day(s)  Current Patient Status: Inpatient   Certification Statement: The patient will continue to require additional inpatient hospital stay due to sob, hypoxia  Discharge Plan: Anticipate discharge tomorrow to home  Code Status: Level 1 - Full Code    Subjective:   States he is feeling better overall  Does note some shortness of breath with exertion  Noted to be hypoxic overnight requiring 2 L nasal cannula  Overall symptoms are improving  Right lower extremity swelling is present but denies any pain  Swelling seems to be improving as well  Objective:     Vitals:   Temp (24hrs), Av 1 °F (36 7 °C), Min:97 6 °F (36 4 °C), Max:99 3 °F (37 4 °C)    Temp:  [97 6 °F (36 4 °C)-99 3 °F (37 4 °C)] 97 6 °F (36 4 °C)  HR:  [] 81  Resp:  [13-22] 16  BP: ()/(53-79) 95/53  SpO2:  [92 %-97 %] 96 %  Body mass index is 38 62 kg/m²  Input and Output Summary (last 24 hours):      Intake/Output Summary (Last 24 hours) at 2023 1059  Last data filed at 2023  Gross per 24 hour   Intake 508 15 ml   Output --   Net 508 15 ml       Physical Exam:   Physical Exam  Vitals reviewed  Constitutional:       General: He is not in acute distress  Appearance: He is well-developed  He is not diaphoretic  HENT:      Head: Normocephalic and atraumatic  Mouth/Throat:      Pharynx: No oropharyngeal exudate  Eyes:      General: No scleral icterus  Extraocular Movements: Extraocular movements intact  Conjunctiva/sclera: Conjunctivae normal    Neck:      Vascular: No JVD  Trachea: No tracheal deviation  Cardiovascular:      Rate and Rhythm: Normal rate and regular rhythm  Heart sounds: No murmur heard  No friction rub  No gallop  Pulmonary:      Effort: No respiratory distress  Breath sounds: No stridor  Rales present  No wheezing  Abdominal:      General: There is no distension  Palpations: Abdomen is soft  There is no mass  Tenderness: There is no abdominal tenderness  There is no right CVA tenderness or left CVA tenderness  Musculoskeletal:         General: No tenderness  Normal range of motion  Right lower leg: Edema present  Left lower leg: No edema  Skin:     General: Skin is warm and dry  Coloration: Skin is not pale  Findings: No erythema  Neurological:      Mental Status: He is alert  Psychiatric:         Behavior: Behavior normal          Thought Content:  Thought content normal           Additional Data:     Labs:  Results from last 7 days   Lab Units 05/17/23  0452   WBC Thousand/uL 11 99*   HEMOGLOBIN g/dL 13 6   HEMATOCRIT % 40 7   PLATELETS Thousands/uL 245   NEUTROS PCT % 73   LYMPHS PCT % 16   MONOS PCT % 8   EOS PCT % 1     Results from last 7 days   Lab Units 05/17/23  0452   SODIUM mmol/L 137   POTASSIUM mmol/L 4 4   CHLORIDE mmol/L 107   CO2 mmol/L 25   BUN mg/dL 26*   CREATININE mg/dL 0 96   ANION GAP mmol/L 5   CALCIUM mg/dL 8 9   ALBUMIN g/dL 3 3*   TOTAL BILIRUBIN mg/dL 0 49   ALK PHOS U/L 75   ALT U/L 19   AST U/L 22   GLUCOSE RANDOM mg/dL 96     Results from last 7 days   Lab Units 05/15/23  1003   INR  0 96     Results from last 7 days   Lab Units 05/16/23  2056   POC GLUCOSE mg/dl 112         Results from last 7 days   Lab Units 05/15/23  1226 05/15/23  1003   LACTIC ACID mmol/L 1 4 2 3*   PROCALCITONIN ng/ml  --  0 08       Lines/Drains:  Invasive Devices     Peripheral Intravenous Line  Duration           Peripheral IV 05/15/23 Right Antecubital 1 day                      Imaging: Reviewed radiology reports from this admission including: chest CT scan    Recent Cultures (last 7 days):   Results from last 7 days   Lab Units 05/15/23  1003 05/15/23  1002   BLOOD CULTURE  No Growth at 24 hrs  No Growth at 24 hrs  Last 24 Hours Medication List:   Current Facility-Administered Medications   Medication Dose Route Frequency Provider Last Rate   • apixaban  10 mg Oral BID Loan Look, CRNP     • atorvastatin  20 mg Oral Daily Loan Look, CRNP     • chlorhexidine  15 mL Mouth/Throat Q12H [facility] IVANNA Pandya     • diltiazem  360 mg Oral Daily Loan Look, CRNP     • heparin (porcine)  4,600 Units Intravenous Q6H PRN Loan Look, CRNP     • heparin (porcine)  9,200 Units Intravenous Q6H PRN Loan Look, CRNP     • lisinopril  5 mg Oral Daily Loan Keith, CRNP          Today, Patient Was Seen By: Andrea Cano DO    **Please Note: This note may have been constructed using a voice recognition system  **

## 2023-05-17 NOTE — ASSESSMENT & PLAN NOTE
SOB x 3 days  CTA PE Extensive bilateral pulmonary emboli, greater on the right, with near complete occlusion of the right lower lobe pulmonary arteries with saddle embolus  Elevation of the RV/LV diameter ratio at 1 6 indicating right heart strain    • Transition from heparin drip to Eliquis  • ECHO completed with evidence of right heart strain  • Discuss with case management/cardiology oral anticoagulation  • Right lower extremity duplex Evidence suggestive of Acute occlusive deep vein thrombosis noted in the  proximal-distal superficial femoral, popliteal, gastrocnemius, posterior tibial  · Does report a sedentary lifestyle, hematology/oncology referral outpatient for hypercoagulable work-up

## 2023-05-18 ENCOUNTER — TELEPHONE (OUTPATIENT)
Dept: HEMATOLOGY ONCOLOGY | Facility: CLINIC | Age: 68
End: 2023-05-18

## 2023-05-18 VITALS
SYSTOLIC BLOOD PRESSURE: 99 MMHG | DIASTOLIC BLOOD PRESSURE: 60 MMHG | BODY MASS INDEX: 37.43 KG/M2 | WEIGHT: 261.47 LBS | HEIGHT: 70 IN | RESPIRATION RATE: 16 BRPM | TEMPERATURE: 97.8 F | OXYGEN SATURATION: 97 % | HEART RATE: 87 BPM

## 2023-05-18 PROBLEM — J96.01 ACUTE RESPIRATORY FAILURE WITH HYPOXEMIA (HCC): Status: RESOLVED | Noted: 2023-05-15 | Resolved: 2023-05-18

## 2023-05-18 LAB
ANION GAP SERPL CALCULATED.3IONS-SCNC: 4 MMOL/L (ref 4–13)
BUN SERPL-MCNC: 21 MG/DL (ref 5–25)
CALCIUM SERPL-MCNC: 8.9 MG/DL (ref 8.4–10.2)
CHLORIDE SERPL-SCNC: 106 MMOL/L (ref 96–108)
CO2 SERPL-SCNC: 27 MMOL/L (ref 21–32)
CREAT SERPL-MCNC: 0.88 MG/DL (ref 0.6–1.3)
ERYTHROCYTE [DISTWIDTH] IN BLOOD BY AUTOMATED COUNT: 13.8 % (ref 11.6–15.1)
GFR SERPL CREATININE-BSD FRML MDRD: 87 ML/MIN/1.73SQ M
GLUCOSE SERPL-MCNC: 93 MG/DL (ref 65–140)
HCT VFR BLD AUTO: 42.2 % (ref 36.5–49.3)
HGB BLD-MCNC: 14.1 G/DL (ref 12–17)
MCH RBC QN AUTO: 30.1 PG (ref 26.8–34.3)
MCHC RBC AUTO-ENTMCNC: 33.4 G/DL (ref 31.4–37.4)
MCV RBC AUTO: 90 FL (ref 82–98)
PLATELET # BLD AUTO: 287 THOUSANDS/UL (ref 149–390)
PMV BLD AUTO: 9.1 FL (ref 8.9–12.7)
POTASSIUM SERPL-SCNC: 3.9 MMOL/L (ref 3.5–5.3)
RBC # BLD AUTO: 4.68 MILLION/UL (ref 3.88–5.62)
SODIUM SERPL-SCNC: 137 MMOL/L (ref 135–147)
WBC # BLD AUTO: 9.53 THOUSAND/UL (ref 4.31–10.16)

## 2023-05-18 RX ADMIN — APIXABAN 10 MG: 5 TABLET, FILM COATED ORAL at 09:15

## 2023-05-18 RX ADMIN — CHLORHEXIDINE GLUCONATE 0.12% ORAL RINSE 15 ML: 1.2 LIQUID ORAL at 09:16

## 2023-05-18 RX ADMIN — ATORVASTATIN CALCIUM 20 MG: 10 TABLET, FILM COATED ORAL at 09:15

## 2023-05-18 NOTE — ASSESSMENT & PLAN NOTE
Continue oxygen supplementation for goal SpO2>94%, currently on room air but required 2 L overnight  Shortness of breath is improving now stable on room air    • Encourage good pulmonary hygiene  • Respiratory protocol

## 2023-05-18 NOTE — TELEPHONE ENCOUNTER
I called [facility] in response to a referral that was received for patient to establish care with Hematology  I reached out to schedule a consultation     I left a voicemail explaining the reason for my call and advised patient to call [facility] [place] at [phone]  Another attempt will be made to schedule patient

## 2023-05-18 NOTE — CASE MANAGEMENT
Case Management Discharge Planning Note    Patient name Lissette Santamaria  Location / MRN [id number]  : 1955 Date 2023       Current Admission Date: 5/15/2023  Current Admission Diagnosis:Pulmonary embolism with acute cor pulmonale [facility])   Patient Active Problem List    Diagnosis Date Noted   • SIRS (systemic inflammatory response syndrome) ([facility] ) 05/15/2023   • Acute respiratory failure with hypoxemia ([facility] ) 05/15/2023   • LULI (acute kidney injury) ([facility] ) 05/15/2023   • Pulmonary embolism with acute cor pulmonale ([facility] ) 05/15/2023   • HLD (hyperlipidemia) 05/15/2023   • HTN (hypertension) 05/15/2023   • Acute deep vein thrombosis (DVT) of femoral vein of right lower extremity ([place] [address] ) 05/15/2023      LOS (days): 3  Geometric Mean LOS (GMLOS) (days): 3 90  Days to GMLOS:1     OBJECTIVE:  Risk of Unplanned Readmission Score: 8 55         Current admission status: Inpatient   Preferred Pharmacy:   PATIENT/FAMILY REPORTS NO PREFERRED PHARMACY  No address on file      [id number] [place], [place] - [facility] [id number]  [facility] [address]  Phone: [phone] Fax: [phone]    Primary Care Provider: No primary care provider on file  Primary Insurance: [facility] REP  Secondary Insurance:     DISCHARGE DETAILS:    Discharge planning discussed with[de-identified] Patient  Freedom of Choice: Yes  Comments - Freedom of Choice: CM discussed freedom of choice as it pertains to discharge planning  Patient denied any needs at this time and is not interested in [facility]  Patient confirmed that his wife will transport him home later this afternoon    CM contacted family/caregiver?: No- see comments (Patient declined and reported that his wife will be back shortly )  Were Treatment Team discharge recommendations reviewed with patient/caregiver?: Yes  Did patient/caregiver verbalize understanding of patient care needs?: Yes  Were patient/caregiver advised of the risks associated with not following Treatment Team discharge recommendations?: Yes    [address]         Is the patient interested in [facility] at discharge?: No    DME Referral Provided  Referral made for DME?: No    Other Referral/Resources/Interventions Provided:  Interventions: None Indicated    Would you like to participate in our [address] service program?  : No - Declined    Treatment Team Recommendation: Home  Discharge Destination Plan[de-identified] Home  Transport at Discharge : Family     IMM Given (Date):: 05/18/23  IMM Given to[de-identified] Patient (CM reviewed IMM  Patient reported understanding of these rights and is agreeable with DC determination  Patient signed and was given a copy   IMM placed in medical records )

## 2023-05-18 NOTE — DISCHARGE SUMMARY
[address] [facility]  Discharge Summary  Name: Rajan Blackburn  MRN: [id number]  Unit/Bed#:  I Date of Admission: 5/15/2023   Date of Service: 5/18/2023 I Hospital Day: 3    Assessment/Plan   Acute deep vein thrombosis (DVT) of femoral vein of right lower extremity [facility])  Assessment & Plan  Now On Eliquis, see plan as above    HTN (hypertension)  Assessment & Plan  BP stable, given on lower end will d/c lisinopril  Continue home dose Cardizem 360 mg daily    HLD (hyperlipidemia)  Assessment & Plan  Continue statin 20 mg daily    LULI (acute kidney injury) ([facility] )  Assessment & Plan  • Unknown baseline, resolved  • Close intake and output  • Daily weights  • Monitor with a m  BMP    SIRS (systemic inflammatory response syndrome) ([facility])  Assessment & Plan  • Likely in the setting of acute PE with right heart strain  • Hold on antibiotics  • Cultures negative to date  • Continue telemetry and supplemental O2    * Pulmonary embolism with acute cor pulmonale (HCC)  Assessment & Plan  SOB x 3 days  CTA PE Extensive bilateral pulmonary emboli, greater on the right, with near complete occlusion of the right lower lobe pulmonary arteries with saddle embolus  Elevation of the RV/LV diameter ratio at 1 6 indicating right heart strain  • Transition from heparin drip to Eliquis  • ECHO completed with evidence of right heart strain  • Discuss with case management/cardiology oral anticoagulation  • Right lower extremity duplex Evidence suggestive of Acute occlusive deep vein thrombosis noted in the  proximal-distal superficial femoral, popliteal, gastrocnemius, posterior tibial  · Does report a sedentary lifestyle, hematology/oncology referral outpatient for hypercoagulable work-up      Acute respiratory failure with hypoxemia (HCC)-resolved as of 5/18/2023  Assessment & Plan  Continue oxygen supplementation for goal SpO2>94%, currently on room air but required 2 L overnight    Shortness of breath is improving now stable on room air  • Encourage good pulmonary hygiene  • Respiratory protocol                  Medical Problems     Resolved Problems  Date Reviewed: 5/18/2023          Resolved    Acute respiratory failure with hypoxemia ([address] ) 5/18/2023     Resolved by  Trinity Sidhu DO    Lactic acidosis 5/15/2023     Resolved by  Ghulam Garza        Discharging Physician / Practitioner: Trinity Sidhu DO  PCP: No primary care provider on file  Admission Date:   Admission Orders (From admission, onward)     Ordered        05/15/23 [address]  Once                      Discharge Date: 05/18/23    Consultations During Hospital Stay:  · NA    Procedures Performed:   · NA    Significant Findings / Test Results:   · Acute DVT on right  · Extensive bilateral pulmonary emboli, greater on the right with near complete occlusion of right lower lobe pulmonary artery with saddle embolism  Ovation of RV over LV diameter ratio 1 6 indicating right heart strain  Large hiatal hernia  · I reviewed the above mentioned incidental findings with the patient and/or family and they expressed understanding  Test Results Pending at Discharge (will require follow up):   · n/a     Outpatient Tests Requested:  · n/a    Complications:  none    Reason for Admission: Shortness of breath    Hospital Course:   Ruslan Morgan is a 77 y o  male patient who originally presented to the hospital on 5/15/2023 due to worsening shortness of breath, right lower extremity pain, swelling cramping  CTA PE revealing extensive bilateral right greater than left PE with RV strain  EKG ST with right bundle branch block  TTE shows dilated RV but normal function, EF 60%  Initiated on IV heparin  Noted in the ICU for 24 hours  No acute interventions noted patient remained hemodynamically stable  Transfer to floor  Monitor on floor with no acute changes in hemodynamics  Shortness of breath improved    Lower extremity swelling "continue to improve  DVT noted on imaging as above  Request price check, affordable for patient  Off oxygen and  stable on room air  Stable for discharge  Please see above list of diagnoses and related plan for additional information  Condition at Discharge: good    Discharge Day Visit / Exam:   Subjective:  Pt seen and examined at bedside  Denies shortness of breath or chest pain  Vitals: Blood Pressure: 99/60 (05/18/23 0919)  Pulse: 87 (05/18/23 0919)  Temperature: 97 8 °F (36 6 °C) (05/18/23 0600)  Temp Source: Oral (05/18/23 0600)  Respirations: 16 (05/18/23 0600)  Height: 5' 10\" (177 8 cm) (05/15/23 1500)  Weight - Scale: 119 kg (261 lb 7 5 oz) (05/18/23 0600)  SpO2: 97 % (05/18/23 0919)  Exam:   Physical Exam  Vitals reviewed  Constitutional:       General: He is not in acute distress  Appearance: He is well-developed  He is not diaphoretic  HENT:      Head: Normocephalic and atraumatic  Mouth/Throat:      Pharynx: No oropharyngeal exudate  Eyes:      General: No scleral icterus  Extraocular Movements: Extraocular movements intact  Conjunctiva/sclera: Conjunctivae normal    Neck:      Vascular: No JVD  Trachea: No tracheal deviation  Cardiovascular:      Rate and Rhythm: Normal rate and regular rhythm  Heart sounds: No murmur heard  No friction rub  No gallop  Pulmonary:      Effort: Pulmonary effort is normal  No respiratory distress  Breath sounds: No stridor  No wheezing  Abdominal:      General: There is no distension  Palpations: Abdomen is soft  There is no mass  Tenderness: There is no abdominal tenderness  There is no right CVA tenderness or left CVA tenderness  Musculoskeletal:         General: No tenderness  Normal range of motion  Right lower leg: No edema  Left lower leg: No edema  Skin:     General: Skin is warm and dry  Coloration: Skin is not pale  Findings: No erythema     Neurological:      Mental " Status: He is alert and oriented to person, place, and time  Psychiatric:         Behavior: Behavior normal          Thought Content: Thought content normal           Discussion with Family: Updated  (wife) at bedside  Discharge instructions/Information to patient and family:   See after visit summary for information provided to patient and family  Provisions for Follow-Up Care:  See after visit summary for information related to follow-up care and any pertinent home health orders  Disposition:   Home    Planned Readmission: none     Discharge Statement:  I spent 54 minutes discharging the patient  This time was spent on the day of discharge  I had direct contact with the patient on the day of discharge  Greater than 50% of the total time was spent examining patient, answering all patient questions, arranging and discussing plan of care with patient as well as directly providing post-discharge instructions  Additional time then spent on discharge activities  Discharge Medications:  See after visit summary for reconciled discharge medications provided to patient and/or family        **Please Note: This note may have been constructed using a voice recognition system**

## 2023-05-19 ENCOUNTER — TELEPHONE (OUTPATIENT)
Dept: HEMATOLOGY ONCOLOGY | Facility: CLINIC | Age: 68
End: 2023-05-19

## 2023-05-19 NOTE — TELEPHONE ENCOUNTER
Called and spoke with wife regarding referral for hematology   She told me to call back Monday to schedule because pt cannot schedule at this time

## 2023-05-20 LAB
BACTERIA BLD CULT: NORMAL
BACTERIA BLD CULT: NORMAL

## 2023-07-14 ENCOUNTER — CONSULT (OUTPATIENT)
Dept: HEMATOLOGY ONCOLOGY | Facility: CLINIC | Age: 68
End: 2023-07-14

## 2023-07-14 VITALS
HEART RATE: 95 BPM | RESPIRATION RATE: 16 BRPM | SYSTOLIC BLOOD PRESSURE: 138 MMHG | DIASTOLIC BLOOD PRESSURE: 82 MMHG | OXYGEN SATURATION: 97 % | BODY MASS INDEX: 38.51 KG/M2 | WEIGHT: 269 LBS | HEIGHT: 70 IN | TEMPERATURE: 98.2 F

## 2023-07-14 DIAGNOSIS — I26.99 PULMONARY EMBOLI (HCC): ICD-10-CM

## 2023-07-14 DIAGNOSIS — I82.409 ACUTE DEEP VEIN THROMBOSIS (DVT) OF LOWER EXTREMITY, UNSPECIFIED LATERALITY, UNSPECIFIED VEIN (HCC): Primary | ICD-10-CM

## 2023-07-14 NOTE — PROGRESS NOTES
[address] [facility] Hematology & Medical Oncology  Outpatient Visit Encounter Note      Ernesto Parikh 77 y.o. 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 here for new consultation with me today. The patient is referred by internal medicine and the reason for consultation is history of DVT, PE. In review of the chart and talking with the patient, patient presented to emergency department on 5/15/2023 due to shortness of breath, he was at a visit with his primary care physician SPO2 85% on room air no history of home oxygen, new onset productive cough. Per ED report patient denied at the time leg pain or swelling, history of DVT or PE. Patient was started on heparin drip, transition to [address] prior to discharge. Patient currently on Eliquis therapeutic dosing twice daily. No travel, trauma, surgical procedure prior to VTE. 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 Had a colonoscopy in January, no other procedures prior. Had no heparin exposure in the year prior to patient's clotting.     Currently tolerating Eliquis well denies any excessive bleeding, blood in stool or urine, dark tarry stool, or excessive bruising. I have reviewed the relevant past medical, surgical, social and family history. I have also reviewed allergies and medications for this patient. Review of Systems  Review of Systems   All other systems reviewed and are negative. OBJECTIVE     Physical Exam  Vitals:    07/14/23 0854   BP: 138/82   Pulse: 95   Resp: 16   Temp: 98.2 °F (36.8 °C)   TempSrc: Temporal   SpO2: 97%   Weight: 122 kg (269 lb)   Height: 5' 10" (1.778 m)       Physical Exam  Vitals reviewed. Constitutional:       General: He is not in acute distress. Appearance: Normal appearance. He is not ill-appearing. HENT:      Head: Normocephalic and atraumatic. Eyes:      General: No scleral icterus. Cardiovascular:      Rate and Rhythm: Normal rate and regular rhythm. Heart sounds: Normal heart sounds. Pulmonary:      Effort: Pulmonary effort is normal. No respiratory distress. Abdominal:      General: There is no distension. Palpations: Abdomen is soft. Musculoskeletal:      Right lower leg: Edema present. Left lower leg: Edema present. Lymphadenopathy:      Cervical: No cervical adenopathy. Skin:     General: Skin is warm and dry. Findings: No bruising or rash. Neurological:      Mental Status: He is alert and oriented to person, place, and time. Psychiatric:         Mood and Affect: Mood normal.         Behavior: Behavior normal.        B/l lower extremity edema equal b/l, improving per patient. Imaging  Relevant imaging reviewed in chart    5/15/2023 past lower limb venous duplex study  CONCLUSION:     Impression:  RIGHT LOWER LIMB:  Evidence suggestive of Acute occlusive deep vein thrombosis noted in the  proximal-distal superficial femoral, popliteal, gastrocnemius, posterior tibial,  and peroneal veins. No evidence of superficial thrombophlebitis noted.   Doppler evaluation shows a normal response to augmentation maneuvers. .  Popliteal, posterior tibial and anterior tibial arterial Doppler waveform's are  triphasic. LEFT LOWER LIMB:  No evidence of acute or chronic deep vein thrombosis. No evidence of superficial thrombophlebitis noted. Doppler evaluation shows a normal response to augmentation maneuvers. Popliteal, posterior tibial and anterior tibial arterial Doppler waveform's are  triphasic. 5/15/2023 CTA ED chest PE study     IMPRESSION:  Extensive bilateral pulmonary emboli, greater on the right, with near complete occlusion of the right lower lobe pulmonary arteries with saddle embolus.     Elevation of the RV/LV diameter ratio at 1.6 indicating right heart strain.     Mild peripheral groundglass opacity in the right upper lobe which could be infectious/inflammatory or could be a pulmonary infarct.     Large hiatal hernia.     Labs  Relevant labs reviewed in chart            Component Ref Range & Units 5/17/23  4:52 AM 5/16/23  5:29 AM 5/15/23 10:02 AM   WBC 4.31 - 10.16 Thousand/uL 11.99 High   14.11 High   11.08 High     RBC 3.88 - 5.62 Million/uL 4.51  4.64  5.21    Hemoglobin 12.0 - 17.0 g/dL 13.6  13.9  15.6    Hematocrit 36.5 - 49.3 % 40.7  41.9  48.1    MCV 82 - 98 fL 90  90  92    MCH 26.8 - 34.3 pg 30.2  30.0  29.9    MCHC 31.4 - 37.4 g/dL 33.4  33.2  32.4    RDW 11.6 - 15.1 % 13.8  13.7  13.6    MPV 8.9 - 12.7 fL 8.8 Low   8.9  8.7 Low     Platelets 385 - 087 Thousands/uL 245  268  320    nRBC /100 WBCs 0  0 CM  0    Neutrophils Relative 43 - 75 % 73  91 High   75    Immat GRANS % 0 - 2 % 1  0  1    Lymphocytes Relative 14 - 44 % 16  5 Low   14    Monocytes Relative 4 - 12 % 8  4  7    Eosinophils Relative 0 - 6 % 1  0  2    Basophils Relative 0 - 1 % 1  0  1    Neutrophils Absolute 1.85 - 7.62 Thousands/µL 8.98 High   12.77 High   8.39 High     Immature Grans Absolute 0.00 - 0.20 Thousand/uL 0.06  0.06  0.08    Lymphocytes Absolute 0.60 - 4.47 Thousands/µL 1.90  0.74 1.55    Monocytes Absolute 0.17 - 1.22 Thousand/µL 0.92  0.52  0.72    Eosinophils Absolute 0.00 - 0.61 Thousand/µL 0.06  0.00  0.25    Basophils Absolute 0.00 - 0.10 Thousands/µL 0.07  0.02  0.09         Component Ref Range & Units 5/17/23  4:52 AM 5/16/23  5:29 AM 5/15/23 10:03 AM   Sodium 135 - 147 mmol/L 137  138  140    Potassium 3.5 - 5.3 mmol/L 4.4  4.5  4.3    Chloride 96 - 108 mmol/L 107  109 High   106    CO2 21 - 32 mmol/L 25  23  26    ANION GAP 4 - 13 mmol/L 5  6  8    BUN 5 - 25 mg/dL 26 High   20  21    Creatinine 0.60 - 1.30 mg/dL 0.96  0.88 CM  1.36 High  CM    Comment: Standardized to IDMS reference method   Glucose 65 - 140 mg/dL 96  137 CM  137 CM    Comment: If the patient is fasting, the ADA then defines impaired fasting glucose as > 100 mg/dL and diabetes as > or equal to 123 mg/dL. Calcium 8.4 - 10.2 mg/dL 8.9  8.6  9.5    Corrected Calcium 8.3 - 10.1 mg/dL 9.5  9.1     AST 13 - 39 U/L 22  21  22    ALT 7 - 52 U/L 19  16 CM  21 CM    Comment: Specimen collection should occur prior to Sulfasalazine administration due to the potential for falsely depressed results. Alkaline Phosphatase 34 - 104 U/L 75  82  100    Total Protein 6.4 - 8.4 g/dL 5.9 Low   6.3 Low   7.6    Albumin 3.5 - 5.0 g/dL 3.3 Low   3.4 Low   4.0    Total Bilirubin 0.20 - 1.00 mg/dL 0.49  0.50 CM  0.64 CM    Comment: Use of this assay is not recommended for patients undergoing treatment with eltrombopag due to the potential for falsely elevated results. N-acetyl-p-benzoquinone imine (metabolite of Acetaminophen) will generate erroneously low results in samples for patients that have taken an overdose of Acetaminophen. eGFR ml/min/1.73sq m 79  87  52           ASSESSMENT & PLAN      Diagnosis ICD-10-CM Associated Orders   1.  Acute deep vein thrombosis (DVT) of lower extremity, unspecified laterality, unspecified vein (HCC)  I82.409 FACTOR V (LEIDEN) MUTATION ANALYSIS     Cardiolipin antibody     Beta 2 microglobulin, serum     Prothrombin gene mutation     CBC and differential     Comprehensive metabolic panel      2. Pulmonary emboli (HCC)  I26.99 Ambulatory Referral to Hematology / Oncology     FACTOR V (LEIDEN) MUTATION ANALYSIS     Cardiolipin antibody     Beta 2 microglobulin, serum     Prothrombin gene mutation     CBC and differential     Comprehensive metabolic panel          77 y.o. male presenting for new consultation with me today regarding hospitalization May 2023 for bilateral PE with right heart strain confirmed on echocardiogram, right lower extremity DVT. · Discussion  · Due to extensive nature of PE/DVT with heart strain demonstrated on echocardiogram, recommend indefinite anticoagulation. Had extensive conversation with patient regarding choice of anticoagulation agent, he expressed understanding that should patient test positive for antiphospholipid syndrome the agent of choice would be Coumadin. Expressed understanding, will begin initial work-up as able while patient is on Eliquis as I prefer to maintain patient on uninterrupted anticoagulation for 6 months. We can discuss at that point in time to hold anticoagulation for 72 hours to 1 week and perform remaining testing for antiphospholipid syndrome, protein C deficiency, protein S deficiency, Antithrombin III deficiency. · We will maintain patient on indefinite anticoagulation in effort to reduce risk of recurrence of VTE including stroke, MI, PE, DVT, death. · Plan/Labs  · Labs as above, will have patient follow-up in October to discuss labs. If abnormal before will call patient with results. Follow Up  • October with labs prior. All questions were answered to the patient's satisfaction during this encounter. They appreciated and thanked me for spending time with them. The patient knows the contact information for our office and know to reach out for any relevant concerns related to this encounter.  For all other listed problems and medical diagnosis in his chart - they are managed by PCP and/or other specialists, which patient acknowledges.         Hematology & Medical Oncology

## 2023-08-15 ENCOUNTER — APPOINTMENT (OUTPATIENT)
Dept: LAB | Facility: HOSPITAL | Age: 68
End: 2023-08-15
Payer: COMMERCIAL

## 2023-08-15 DIAGNOSIS — I26.99 PULMONARY EMBOLI (HCC): Primary | ICD-10-CM

## 2023-08-15 DIAGNOSIS — I82.409 ACUTE DEEP VEIN THROMBOSIS (DVT) OF LOWER EXTREMITY, UNSPECIFIED LATERALITY, UNSPECIFIED VEIN (HCC): ICD-10-CM

## 2023-08-15 LAB
ALBUMIN SERPL BCP-MCNC: 4 G/DL (ref 3.5–5)
ALP SERPL-CCNC: 76 U/L (ref 34–104)
ALT SERPL W P-5'-P-CCNC: 12 U/L (ref 7–52)
ANION GAP SERPL CALCULATED.3IONS-SCNC: 4 MMOL/L
AST SERPL W P-5'-P-CCNC: 17 U/L (ref 13–39)
BASOPHILS # BLD AUTO: 0.05 THOUSANDS/ÂΜL (ref 0–0.1)
BASOPHILS NFR BLD AUTO: 1 % (ref 0–1)
BILIRUB SERPL-MCNC: 0.73 MG/DL (ref 0.2–1)
BUN SERPL-MCNC: 14 MG/DL (ref 5–25)
CALCIUM SERPL-MCNC: 9.9 MG/DL (ref 8.4–10.2)
CHLORIDE SERPL-SCNC: 108 MMOL/L (ref 96–108)
CO2 SERPL-SCNC: 29 MMOL/L (ref 21–32)
CREAT SERPL-MCNC: 0.96 MG/DL (ref 0.6–1.3)
EOSINOPHIL # BLD AUTO: 0.08 THOUSAND/ÂΜL (ref 0–0.61)
EOSINOPHIL NFR BLD AUTO: 1 % (ref 0–6)
ERYTHROCYTE [DISTWIDTH] IN BLOOD BY AUTOMATED COUNT: 15.2 % (ref 11.6–15.1)
GFR SERPL CREATININE-BSD FRML MDRD: 79 ML/MIN/1.73SQ M
GLUCOSE P FAST SERPL-MCNC: 94 MG/DL (ref 65–99)
HCT VFR BLD AUTO: 48.7 % (ref 36.5–49.3)
HGB BLD-MCNC: 15.8 G/DL (ref 12–17)
IMM GRANULOCYTES # BLD AUTO: 0.01 THOUSAND/UL (ref 0–0.2)
IMM GRANULOCYTES NFR BLD AUTO: 0 % (ref 0–2)
LYMPHOCYTES # BLD AUTO: 1.24 THOUSANDS/ÂΜL (ref 0.6–4.47)
LYMPHOCYTES NFR BLD AUTO: 20 % (ref 14–44)
MCH RBC QN AUTO: 29.2 PG (ref 26.8–34.3)
MCHC RBC AUTO-ENTMCNC: 32.4 G/DL (ref 31.4–37.4)
MCV RBC AUTO: 90 FL (ref 82–98)
MONOCYTES # BLD AUTO: 0.6 THOUSAND/ÂΜL (ref 0.17–1.22)
MONOCYTES NFR BLD AUTO: 9 % (ref 4–12)
NEUTROPHILS # BLD AUTO: 4.39 THOUSANDS/ÂΜL (ref 1.85–7.62)
NEUTS SEG NFR BLD AUTO: 69 % (ref 43–75)
NRBC BLD AUTO-RTO: 0 /100 WBCS
PLATELET # BLD AUTO: 296 THOUSANDS/UL (ref 149–390)
PMV BLD AUTO: 9.7 FL (ref 8.9–12.7)
POTASSIUM SERPL-SCNC: 4.5 MMOL/L (ref 3.5–5.3)
PROT SERPL-MCNC: 6.9 G/DL (ref 6.4–8.4)
RBC # BLD AUTO: 5.42 MILLION/UL (ref 3.88–5.62)
SODIUM SERPL-SCNC: 141 MMOL/L (ref 135–147)
WBC # BLD AUTO: 6.37 THOUSAND/UL (ref 4.31–10.16)

## 2023-08-15 PROCEDURE — 85025 COMPLETE CBC W/AUTO DIFF WBC: CPT

## 2023-08-15 PROCEDURE — 86147 CARDIOLIPIN ANTIBODY EA IG: CPT

## 2023-08-15 PROCEDURE — 82232 ASSAY OF BETA-2 PROTEIN: CPT

## 2023-08-15 PROCEDURE — 36415 COLL VENOUS BLD VENIPUNCTURE: CPT

## 2023-08-17 LAB — B2 MICROGLOB SERPL-MCNC: 1.8 MG/L (ref 0.6–2.4)

## 2023-08-18 LAB
CARDIOLIPIN IGA SER IA-ACNC: 1.9
CARDIOLIPIN IGG SER IA-ACNC: 1.6
CARDIOLIPIN IGM SER IA-ACNC: 2.4

## 2023-10-03 ENCOUNTER — OFFICE VISIT (OUTPATIENT)
Dept: HEMATOLOGY ONCOLOGY | Facility: CLINIC | Age: 68
End: 2023-10-03

## 2023-10-03 VITALS
HEIGHT: 70 IN | OXYGEN SATURATION: 98 % | BODY MASS INDEX: 37.94 KG/M2 | SYSTOLIC BLOOD PRESSURE: 136 MMHG | DIASTOLIC BLOOD PRESSURE: 80 MMHG | WEIGHT: 265 LBS | HEART RATE: 86 BPM | TEMPERATURE: 98.8 F | RESPIRATION RATE: 18 BRPM

## 2023-10-03 DIAGNOSIS — Z79.01 ON CONTINUOUS ORAL ANTICOAGULATION: ICD-10-CM

## 2023-10-03 DIAGNOSIS — I82.409 ACUTE DEEP VEIN THROMBOSIS (DVT) OF LOWER EXTREMITY, UNSPECIFIED LATERALITY, UNSPECIFIED VEIN (HCC): Primary | ICD-10-CM

## 2023-10-03 DIAGNOSIS — I26.99 PULMONARY EMBOLISM, OTHER, UNSPECIFIED CHRONICITY, UNSPECIFIED WHETHER ACUTE COR PULMONALE PRESENT (HCC): ICD-10-CM

## 2023-10-03 NOTE — PROGRESS NOTES
[address] [facility] - Hematology & Medical Oncology  Outpatient Visit Encounter Note      Lily Jones 77 y.o. 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 here for new consultation with me today. The patient is referred by internal medicine and the reason for consultation is history of DVT, PE. In review of the chart and talking with the patient, patient presented to emergency department on 5/15/2023 due to shortness of breath, he was at a visit with his primary care physician SPO2 85% on room air no history of home oxygen, new onset productive cough. Per ED report patient denied at the time leg pain or swelling, history of DVT or PE. Patient was started on heparin drip, transition to [address] prior to discharge. Patient currently on Eliquis therapeutic dosing twice daily. No travel, trauma, surgical procedure prior to VTE. 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 Had a colonoscopy in January, no other procedures prior. Had no heparin exposure in the year prior to patient's clotting.     Currently tolerating Eliquis well denies any excessive bleeding, blood in stool or urine, dark tarry stool, or excessive bruising. TODAY:  1 month into anticoagulation resolution of edema and pain in extremity. Occasional 'twinge' in lower extremity but attributes this to positional setting. . No blood loss on Eliquis. NO SOB, CP, cough, no blood loss in stool or urine, no mucosal bleeding, no excessive bruising. No fevers, unintentional weight loss, lymphadenopathy. I have reviewed the relevant past medical, surgical, social and family history. I have also reviewed allergies and medications for this patient. Review of Systems  Review of Systems   All other systems reviewed and are negative. OBJECTIVE     Physical Exam  Vitals:    10/03/23 1522   BP: 136/80   Pulse: 86   Resp: 18   Temp: 98.8 °F (37.1 °C)   TempSrc: Temporal   SpO2: 98%   Weight: 120 kg (265 lb)   Height: 5' 10" (1.778 m)       Physical Exam  Vitals reviewed. Constitutional:       General: He is not in acute distress. Appearance: Normal appearance. He is not ill-appearing. HENT:      Head: Normocephalic and atraumatic. Eyes:      General: No scleral icterus. Cardiovascular:      Rate and Rhythm: Normal rate and regular rhythm. Heart sounds: Normal heart sounds. Pulmonary:      Effort: Pulmonary effort is normal. No respiratory distress. Abdominal:      General: There is no distension. Palpations: Abdomen is soft. Musculoskeletal:      Right lower leg: No edema. Left lower leg: No edema. Lymphadenopathy:      Cervical: No cervical adenopathy. Skin:     General: Skin is warm and dry. Findings: No bruising, lesion or rash. Neurological:      Mental Status: He is alert and oriented to person, place, and time. Psychiatric:         Mood and Affect: Mood normal.         Behavior: Behavior normal.        B/l lower extremity edema equal b/l, improving per patient.      Imaging  Relevant imaging reviewed in chart    5/15/2023 past lower limb venous duplex study  CONCLUSION:     Impression:  RIGHT LOWER LIMB:  Evidence suggestive of Acute occlusive deep vein thrombosis noted in the  proximal-distal superficial femoral, popliteal, gastrocnemius, posterior tibial,  and peroneal veins. No evidence of superficial thrombophlebitis noted. Doppler evaluation shows a normal response to augmentation maneuvers. .  Popliteal, posterior tibial and anterior tibial arterial Doppler waveform's are  triphasic. LEFT LOWER LIMB:  No evidence of acute or chronic deep vein thrombosis. No evidence of superficial thrombophlebitis noted. Doppler evaluation shows a normal response to augmentation maneuvers. Popliteal, posterior tibial and anterior tibial arterial Doppler waveform's are  triphasic. 5/15/2023 CTA ED chest PE study     IMPRESSION:  Extensive bilateral pulmonary emboli, greater on the right, with near complete occlusion of the right lower lobe pulmonary arteries with saddle embolus.     Elevation of the RV/LV diameter ratio at 1.6 indicating right heart strain.     Mild peripheral groundglass opacity in the right upper lobe which could be infectious/inflammatory or could be a pulmonary infarct.     Large hiatal hernia. Labs  Relevant labs reviewed in chart     ASSESSMENT & PLAN      Diagnosis ICD-10-CM Associated Orders   1. Acute deep vein thrombosis (DVT) of lower extremity, unspecified laterality, unspecified vein (HCC)  I82.409 Beta-2 glycoprotein antibodies     Protein C activity     Protein S activity     Protein S Antigen, Total & Free     Lupus anticoagulant     Antithrombin III Activity     CBC and differential     Comprehensive metabolic panel      2.  Pulmonary embolism, other, unspecified chronicity, unspecified whether acute cor pulmonale present (HCC)  I26.99 Beta-2 glycoprotein antibodies     Protein C activity     Protein S activity     Protein S Antigen, Total & Free     Lupus anticoagulant     Antithrombin III Activity     CBC and differential     Comprehensive metabolic panel      3. On continuous oral anticoagulation  Z79.01 CBC and differential     Comprehensive metabolic panel          77 y.o. male presenting for new consultation with me today regarding hospitalization May 2023 for bilateral PE with right heart strain confirmed on echocardiogram, right lower extremity DVT. · Discussion  · Due to extensive nature of PE/DVT with heart strain demonstrated on echocardiogram, recommend indefinite anticoagulation. Had extensive conversation with patient regarding choice of anticoagulation agent, he is comfortable currently with twice a day Eliquis dosing. Did express that agent may be able to be changed to Xarelto pending testing as below should he prefer him to be cost efficient for patient as this is a once a day dosing. Six months of uninterupted anticoagulation will be approximatley end of October. To hold [facility] for one week and perform testing as above for antiphospholipid syndrome, protein C deficiency, protein S deficiency, Antithrombin III deficiency. Then to resume eliquis prior to seeing us in follow up / after testing to discuss at next appointment. · Factor V Leiden and Prothrombin Gene mutation are not covered by patient's insurance. Will not  at this time. · We will maintain patient on indefinite therapeutic anticoagulation in effort to reduce risk of recurrence of VTE including stroke, MI, PE, DVT, death. · Plan/Labs  As above    Follow Up  • 4-6 weeks with labs prior      All questions were answered to the patient's satisfaction during this encounter. They appreciated and thanked me for spending time with them. The patient knows the contact information for our office and know to reach out for any relevant concerns related to this encounter.  For all other listed problems and medical diagnosis in his chart - they are managed by PCP and/or other specialists, which patient acknowledges.         Hematology & Medical Oncology

## 2023-10-03 NOTE — PATIENT INSTRUCTIONS
[address] [facility], [address] [facility]  Phone  [phone]    Hours  Monday: 6:30 AM - 5:00 PM  Tuesday: 6:30 AM - 5:00 PM  Wednesday: 6:30 AM - 5:00 PM  Thursday: 6:30 AM - 5:00 PM  Friday: 6:30 AM - 5:00 PM  Saturday: 7:00 AM - 12:00 PM      Please hold Eliquis starting Friday 10/6 (last dose will be PM dose on 10/5/23). Hold this until lab draw on 10/13/23. Resume 5mg eliquis twice daily after labs are collected. Will see you in office to discuss lab results with one of our physicians. Please call us with any questions or concerns.

## 2023-10-13 ENCOUNTER — APPOINTMENT (OUTPATIENT)
Dept: LAB | Facility: HOSPITAL | Age: 68
End: 2023-10-13
Payer: COMMERCIAL

## 2023-10-13 DIAGNOSIS — I82.409 ACUTE DEEP VEIN THROMBOSIS (DVT) OF LOWER EXTREMITY, UNSPECIFIED LATERALITY, UNSPECIFIED VEIN (HCC): ICD-10-CM

## 2023-10-13 DIAGNOSIS — I26.99 PULMONARY EMBOLISM, OTHER, UNSPECIFIED CHRONICITY, UNSPECIFIED WHETHER ACUTE COR PULMONALE PRESENT (HCC): ICD-10-CM

## 2023-10-13 DIAGNOSIS — Z79.01 ON CONTINUOUS ORAL ANTICOAGULATION: ICD-10-CM

## 2023-10-13 LAB
ALBUMIN SERPL BCP-MCNC: 3.9 G/DL (ref 3.5–5)
ALP SERPL-CCNC: 73 U/L (ref 34–104)
ALT SERPL W P-5'-P-CCNC: 16 U/L (ref 7–52)
ANION GAP SERPL CALCULATED.3IONS-SCNC: 4 MMOL/L
AST SERPL W P-5'-P-CCNC: 17 U/L (ref 13–39)
BASOPHILS # BLD AUTO: 0.06 THOUSANDS/ÂΜL (ref 0–0.1)
BASOPHILS NFR BLD AUTO: 1 % (ref 0–1)
BILIRUB SERPL-MCNC: 0.64 MG/DL (ref 0.2–1)
BUN SERPL-MCNC: 22 MG/DL (ref 5–25)
CALCIUM SERPL-MCNC: 9.1 MG/DL (ref 8.4–10.2)
CHLORIDE SERPL-SCNC: 108 MMOL/L (ref 96–108)
CO2 SERPL-SCNC: 28 MMOL/L (ref 21–32)
CREAT SERPL-MCNC: 0.88 MG/DL (ref 0.6–1.3)
EOSINOPHIL # BLD AUTO: 0.14 THOUSAND/ÂΜL (ref 0–0.61)
EOSINOPHIL NFR BLD AUTO: 2 % (ref 0–6)
ERYTHROCYTE [DISTWIDTH] IN BLOOD BY AUTOMATED COUNT: 15.8 % (ref 11.6–15.1)
GFR SERPL CREATININE-BSD FRML MDRD: 87 ML/MIN/1.73SQ M
GLUCOSE P FAST SERPL-MCNC: 93 MG/DL (ref 65–99)
HCT VFR BLD AUTO: 47.1 % (ref 36.5–49.3)
HGB BLD-MCNC: 15.3 G/DL (ref 12–17)
IMM GRANULOCYTES # BLD AUTO: 0.02 THOUSAND/UL (ref 0–0.2)
IMM GRANULOCYTES NFR BLD AUTO: 0 % (ref 0–2)
LYMPHOCYTES # BLD AUTO: 1.39 THOUSANDS/ÂΜL (ref 0.6–4.47)
LYMPHOCYTES NFR BLD AUTO: 20 % (ref 14–44)
MCH RBC QN AUTO: 29.9 PG (ref 26.8–34.3)
MCHC RBC AUTO-ENTMCNC: 32.5 G/DL (ref 31.4–37.4)
MCV RBC AUTO: 92 FL (ref 82–98)
MONOCYTES # BLD AUTO: 0.67 THOUSAND/ÂΜL (ref 0.17–1.22)
MONOCYTES NFR BLD AUTO: 9 % (ref 4–12)
NEUTROPHILS # BLD AUTO: 4.86 THOUSANDS/ÂΜL (ref 1.85–7.62)
NEUTS SEG NFR BLD AUTO: 68 % (ref 43–75)
NRBC BLD AUTO-RTO: 0 /100 WBCS
PLATELET # BLD AUTO: 286 THOUSANDS/UL (ref 149–390)
PMV BLD AUTO: 9.3 FL (ref 8.9–12.7)
POTASSIUM SERPL-SCNC: 4.5 MMOL/L (ref 3.5–5.3)
PROT SERPL-MCNC: 6.9 G/DL (ref 6.4–8.4)
RBC # BLD AUTO: 5.12 MILLION/UL (ref 3.88–5.62)
SODIUM SERPL-SCNC: 140 MMOL/L (ref 135–147)
WBC # BLD AUTO: 7.14 THOUSAND/UL (ref 4.31–10.16)

## 2023-10-13 PROCEDURE — 85670 THROMBIN TIME PLASMA: CPT

## 2023-10-13 PROCEDURE — 85705 THROMBOPLASTIN INHIBITION: CPT

## 2023-10-13 PROCEDURE — 85613 RUSSELL VIPER VENOM DILUTED: CPT

## 2023-10-13 PROCEDURE — 85305 CLOT INHIBIT PROT S TOTAL: CPT

## 2023-10-13 PROCEDURE — 85025 COMPLETE CBC W/AUTO DIFF WBC: CPT

## 2023-10-13 PROCEDURE — 80053 COMPREHEN METABOLIC PANEL: CPT | Performed by: PHYSICIAN ASSISTANT

## 2023-10-13 PROCEDURE — 85306 CLOT INHIBIT PROT S FREE: CPT

## 2023-10-13 PROCEDURE — 85300 ANTITHROMBIN III ACTIVITY: CPT

## 2023-10-13 PROCEDURE — 36415 COLL VENOUS BLD VENIPUNCTURE: CPT

## 2023-10-13 PROCEDURE — 86146 BETA-2 GLYCOPROTEIN ANTIBODY: CPT

## 2023-10-13 PROCEDURE — 85303 CLOT INHIBIT PROT C ACTIVITY: CPT

## 2023-10-13 PROCEDURE — 85732 THROMBOPLASTIN TIME PARTIAL: CPT

## 2023-10-14 LAB — DEPRECATED AT III PPP: 97 % OF NORMAL (ref 92–136)

## 2023-10-15 LAB
APTT SCREEN TO CONFIRM RATIO: 1.07 RATIO (ref 0–1.34)
CONFIRM APTT/NORMAL: 37.4 SEC (ref 0–47.6)
LA PPP-IMP: NORMAL
PROT S ACT/NOR PPP: 99 % (ref 61–136)
PROT S PPP-ACNC: 84 % (ref 60–150)
SCREEN APTT: 32 SEC (ref 0–43.5)
SCREEN DRVVT: 35 SEC (ref 0–47)
THROMBIN TIME: 16.8 SEC (ref 0–23)

## 2023-10-16 LAB
PROT C AG ACT/NOR PPP IA: 110 % OF NORMAL (ref 60–150)
PROT S ACT/NOR PPP: 96 % (ref 71–117)

## 2023-10-17 LAB
B2 GLYCOPROT1 IGA SERPL IA-ACNC: 0.9
B2 GLYCOPROT1 IGG SERPL IA-ACNC: <0.8
B2 GLYCOPROT1 IGM SERPL IA-ACNC: <2.4

## 2023-11-15 ENCOUNTER — OFFICE VISIT (OUTPATIENT)
Dept: HEMATOLOGY ONCOLOGY | Facility: CLINIC | Age: 68
End: 2023-11-15
Payer: COMMERCIAL

## 2023-11-15 VITALS
TEMPERATURE: 98.2 F | HEIGHT: 70 IN | DIASTOLIC BLOOD PRESSURE: 80 MMHG | HEART RATE: 82 BPM | OXYGEN SATURATION: 95 % | SYSTOLIC BLOOD PRESSURE: 134 MMHG | BODY MASS INDEX: 37.8 KG/M2 | WEIGHT: 264 LBS | RESPIRATION RATE: 18 BRPM

## 2023-11-15 DIAGNOSIS — I26.99 PULMONARY EMBOLISM, OTHER, UNSPECIFIED CHRONICITY, UNSPECIFIED WHETHER ACUTE COR PULMONALE PRESENT (HCC): Primary | ICD-10-CM

## 2023-11-15 DIAGNOSIS — E66.01 OBESITY, MORBID (HCC): ICD-10-CM

## 2023-11-15 PROCEDURE — 99204 OFFICE O/P NEW MOD 45 MIN: CPT | Performed by: INTERNAL MEDICINE

## 2023-11-15 RX ORDER — LOSARTAN POTASSIUM 25 MG/1
25 TABLET ORAL DAILY
COMMUNITY
Start: 2023-09-26

## 2023-11-15 NOTE — PROGRESS NOTES
Yohannes Rodriguez  1955  Erica BECK  [facility] [address]  [address] [facility] [place] [address]    CHIEF COMPLAINT:       Hx DVT's /PE. Currently on DOAC. No new complaints. States that his leg swelling has been receding, with no new episodes of SOB. HISTORY OF PRESENT ILLNESS:     Patient is a 77year old male, who presented in  with bilateral DVT's and multiple PE's, with mild right heart strain, currently on life-time anti-coagulation. Work-up from thrombophilia was negative, except for FVL and Prothrombin gene mutation. Currently on Apixaban, with no adverse effects. Patient Active Problem List   Diagnosis    SIRS (systemic inflammatory response syndrome) (HCC)    LULI (acute kidney injury) ([address])    Pulmonary embolism with acute cor pulmonale (HCC)    HLD (hyperlipidemia)    HTN (hypertension)    Acute deep vein thrombosis (DVT) of femoral vein of right lower extremity (HCC)    Obesity, morbid ([address])     Past Medical History:   Diagnosis Date    Acute respiratory failure with hypoxemia ([address]) 5/15/2023    High cholesterol     Hypertension      Oncology History    No history exists. Past Surgical History:   Procedure Laterality Date    COLON SURGERY       No family history on file. Social History     Socioeconomic History    Marital status: /Civil Union     Spouse name: Not on file    Number of children: Not on file    Years of education: Not on file    Highest education level: Not on file   Occupational History    Not on file   Tobacco Use    Smoking status: Some Days     Types: Cigars    Smokeless tobacco: Never   Substance and Sexual Activity    Alcohol use:  Yes     Alcohol/week: 5.0 standard drinks of alcohol     Types: 5 Standard drinks or equivalent per week    Drug use: Not on file    Sexual activity: Not on file   Other Topics Concern    Not on file   Social History Narrative    Not on file     Social Determinants of Health Financial Resource Strain: Not on file   Food Insecurity: Not on file   Transportation Needs: Not on file   Physical Activity: Not on file   Stress: Not on file   Social Connections: Not on file   Intimate Partner Violence: Not on file   Housing Stability: Not on file       Allergies   Allergen Reactions    Tetracycline Hives     1973             Meds:    Current Outpatient Medications:     apixaban (Eliquis) 5 mg, Take 2 tablets (10 mg total) by mouth 2 (two) times a day for 7 days, THEN 1 tablet (5 mg total) 2 (two) times a day., Disp: 74 tablet, Rfl: 0    atorvastatin (LIPITOR) 20 mg tablet, Take 20 mg by mouth daily, Disp: , Rfl:     diltiazem (TIAZAC) 360 MG 24 hr capsule, Take 360 mg by mouth daily, Disp: , Rfl:     losartan (COZAAR) 25 mg tablet, Take 25 mg by mouth daily, Disp: , Rfl:          REVIEW OF SYSTEMS:  Constitutional:  Denies any fever; denies night sweats; denies weight loss. HEENT:  Denies blurred vission; denies eue drainage; denies bulging eyes; denies hearing impairment; denies tinnitis; denies vertigo; denies sore-throat; denies sinues drainage or post nasal drip. Neck:  Denies neck swelling. Respiratory:  Denies cough; denises coughing up blood; denies chest pains on breathing; denies shortness of breath. Cardiovascular:  Denies chest pains on exertion; denies heart palpitations; denies light headedness or feeling of fainting; denies leg swelling; denies pains in calves; denies pain in legs on walking. GI:  Denies difficulty swallowing; denies heartburn; denies vomiting blood; denies black-colored stools; denies nausea; denies vomiting; denies abdominal paindenies passage of bright red blood. :  Denies blood in urine; denies urinary frequency; denies pain on urination; denies difficulty in passing urine. Spine:  Denies neck pain; denies radiation of pain into arms or legs; denies lower back pain. Hemotology:  Denies easy bruising.     Lymphalics:  Denies new lumps anywhere    Neurological:  Denies headaches; denies dizziness; denies numbness in extremities; denies weakness in extremities; denies poor balance or disequilibrium. Dermatologic:  denies rash; denies itching. PHYSICAL EXAM:  /80 (BP Location: Left arm, Patient Position: Sitting, Cuff Size: Standard)   Pulse 82   Temp 98.2 °F (36.8 °C) (Temporal)   Resp 18   Ht 5' 10" (1.778 m)   Wt 120 kg (264 lb)   SpO2 95%   BMI 37.88 kg/m²      General:  Patient alert; oriented. HEENT:  PERRL; EOM intact; conjunctiva normal; no scleral icterus. Neck:  Trachea midline; thyroid not enlarged; No carotid bruits    Lymphatics:  No submandibular adenopathy; no cervical adenopathy; no supraclavicular adenopathy; no axillary adenopathy; no inguinal adenopathy. Hent:  Regular rhythm; S1 and S2 normal; No murmurs; no rubs; no gallops; peripheral pulses normal and equal bilaterally. Lungs:  Clear to ausculation and percussion    Abdomen:  Soft; non-tender; no masses; no organomegaly; no superficial veins; no hernia; no abdominal bruise. Extremities:Some residual swelling in right leg, but no calf tenderness. Spine:  No gross spinal deformity; no spinal tenderness; no CVA tenderness. Neurological: patient alert and oriented; crainials II - XII intact; no motor deficit; no sensory deficit; Gait normal.    Psychiatric:  Mood is appropriate, affect is normal, no active hallucinations or delusions.       Labs:   Latest Reference Range & Units 10/13/23 10:03   WBC 4.31 - 10.16 Thousand/uL 7.14   Red Blood Cell Count 3.88 - 5.62 Million/uL 5.12   Hemoglobin 12.0 - 17.0 g/dL 15.3   HCT 36.5 - 49.3 % 47.1   MCV 82 - 98 fL 92   MCH 26.8 - 34.3 pg 29.9   MCHC 31.4 - 37.4 g/dL 32.5   RDW 11.6 - 15.1 % 15.8 (H)   Platelet Count 979 - 390 Thousands/uL 286   MPV 8.9 - 12.7 fL 9.3   nRBC /100 WBCs 0   (H): Data is abnormally high     Latest Reference Range & Units 10/13/23 10:03   Sodium 135 - 147 mmol/L 140 Potassium 3.5 - 5.3 mmol/L 4.5   Chloride 96 - 108 mmol/L 108   CO2 21 - 32 mmol/L 28   Anion Gap mmol/L 4   BUN 5 - 25 mg/dL 22   Creatinine 0.60 - 1.30 mg/dL 0.88   GLUCOSE FASTING 65 - 99 mg/dL 93   Calcium 8.4 - 10.2 mg/dL 9.1   AST 13 - 39 U/L 17   ALT 7 - 52 U/L 16   Alkaline Phosphatase 34 - 104 U/L 73   Total Protein 6.4 - 8.4 g/dL 6.9   Albumin 3.5 - 5.0 g/dL 3.9   TOTAL BILIRUBIN 0.20 - 1.00 mg/dL 0.64   eGFR ml/min/1.73sq m 87         Imaging  No results found. I reviewed the above laboratory and imaging data. Assessment/Plan:   Patient with unprovoked DVT's/PE's. On Apixaban. No recurrence of symptoms. No  gene mutations. ..will attempt to get fore completeness of assessment. ..previously denied by insurance. Will not change treatment plan. Will follow up in 6 months.

## 2024-04-12 ENCOUNTER — TELEPHONE (OUTPATIENT)
Dept: HEMATOLOGY ONCOLOGY | Facility: CLINIC | Age: 69
End: 2024-04-12

## 2024-04-12 NOTE — TELEPHONE ENCOUNTER
Appointment Change  Cancel, Reschedule, Change to Virtual      Who are you speaking with? Patient   If it is not the patient, is the caller listed on the communication consent form? N/A   Which provider is the appointment scheduled with? Dr Montemayor   When was the original appointment scheduled?    Please list date and time 5/14/24 @ 10am   At which location is the appointment scheduled to take place? [facility]   Was the appointment rescheduled?     Was the appointment changed from an in person visit to a virtual visit?    If so, please list the details of the change. Yes 5/21/24 @ 9:20am   What is the reason for the appointment change? Dr. Montemayor will be OOO       Was STAR transport scheduled? no   Does STAR transport need to be scheduled for the new visit (if applicable) no   Does the patient need an infusion appointment rescheduled? no   Does the patient have an upcoming infusion appointment scheduled? If so, when? no   Is the patient undergoing chemotherapy? no   For appointments cancelled with less than 24 hours:  Was the no-show policy reviewed? yes

## 2024-05-21 ENCOUNTER — OFFICE VISIT (OUTPATIENT)
Dept: HEMATOLOGY ONCOLOGY | Facility: CLINIC | Age: 69
End: 2024-05-21
Payer: COMMERCIAL

## 2024-05-21 VITALS
HEIGHT: 70 IN | WEIGHT: 260 LBS | DIASTOLIC BLOOD PRESSURE: 70 MMHG | SYSTOLIC BLOOD PRESSURE: 132 MMHG | BODY MASS INDEX: 37.22 KG/M2 | RESPIRATION RATE: 18 BRPM | TEMPERATURE: 98 F | OXYGEN SATURATION: 98 % | HEART RATE: 83 BPM

## 2024-05-21 DIAGNOSIS — I26.09 OTHER PULMONARY EMBOLISM WITH ACUTE COR PULMONALE, UNSPECIFIED CHRONICITY (HCC): ICD-10-CM

## 2024-05-21 DIAGNOSIS — I82.411 ACUTE DEEP VEIN THROMBOSIS (DVT) OF FEMORAL VEIN OF RIGHT LOWER EXTREMITY (HCC): Primary | ICD-10-CM

## 2024-05-21 DIAGNOSIS — E66.01 OBESITY, MORBID (HCC): ICD-10-CM

## 2024-05-21 PROCEDURE — G2211 COMPLEX E/M VISIT ADD ON: HCPCS | Performed by: INTERNAL MEDICINE

## 2024-05-21 PROCEDURE — 99214 OFFICE O/P EST MOD 30 MIN: CPT | Performed by: INTERNAL MEDICINE

## 2024-05-21 NOTE — PROGRESS NOTES
Hematology/Oncology Outpatient Follow- up Note  Lalo Fry 69 y.o. 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  2 Moderate obesity  3 bilateral leg edema to above mid calf chronic  Plan: Patient will continue Eliquis.  I did not make any other major changes.  He is due for eye surgeries.  He will stop the medicine several days prior to going ahead with treatment and will resume after the completion of surgical interventions.  He will come back here in 6 months for recheck.      HPI: 69-year-old gentleman with a history of DVT.  He has a history of multiple PEs.  He has negative thrombophilia workup but factor V Leiden and prothrombin 2021 were not done as apparently patient's insurance would not cover.  He is somewhat overweight.  He has significant edema in both legs least 2-3+ bilaterally without calf tenderness.  He tells me that is fairly chronic for him.  His medications include Cozaar and diltiazem.  He is also on Lipitor.  He does not give a history suggesting congestive heart failure or COPD although I would be suspicious he met may be suffering from 1 or both.  However in terms of his anticoagulation he seems to tolerate Eliquis well and will continue at this time.      Interval History: Note from 11/15/2023:  CHIEF COMPLAINT:     Hx DVT's /PE. Currently on DOAC.  No new complaints.  States that his leg swelling has been receding, with no new episodes of SOB.  HISTORY OF PRESENT ILLNESS:   Patient is a 68 year old male, who presented in  with bilateral DVT's and multiple PE's, with mild right heart strain, currently on life-time anti-coagulation.  Work-up from thrombophilia was negative, except for FVL and Prothrombin gene mutation.  Currently on Apixaban, with no adverse effects.  Assessment/Plan:   Patient with unprovoked DVT's/PE's.  On Apixaban.  No recurrence of symptoms. No  gene mutations...will attempt to get  "fore completeness of assessment...previously denied by insurance.  Will not change treatment plan.  Will follow up in 6 months.    Cancer Staging:  Cancer Staging   No matching staging information was found for the patient.      Molecular Testing:     Previous Hematologic/ Oncologic History:    Oncology History    No history exists.       Current Hematologic/ Oncologic Treatment:       Cycle 1         Test Results:    Imaging: No results found.          Labs:   Lab Results   Component Value Date    WBC 7.14 10/13/2023    HGB 15.3 10/13/2023    HCT 47.1 10/13/2023    MCV 92 10/13/2023     10/13/2023     Lab Results   Component Value Date    K 4.5 10/13/2023     10/13/2023    CO2 28 10/13/2023    BUN 22 10/13/2023    CREATININE 0.88 10/13/2023    GLUF 93 10/13/2023    CALCIUM 9.1 10/13/2023    CORRECTEDCA 9.5 05/17/2023    AST 17 10/13/2023    ALT 16 10/13/2023    ALKPHOS 73 10/13/2023    EGFR 87 10/13/2023         No results found for: \"SPEP\", \"UPEP\"    No results found for: \"PSA\"    No results found for: \"CEA\"    No results found for: \"\"    No results found for: \"AFP\"    No results found for: \"IRON\", \"TIBC\", \"FERRITIN\"    No results found for: \"PNYTYOIX01\"      ROS: Review of Systems      Current Medications: Reviewed  Allergies: Reviewed  PMH/FH/SH:  Reviewed      Physical Exam:    Body surface area is 2.33 meters squared.    Wt Readings from Last 3 Encounters:   05/21/24 118 kg (260 lb)   11/15/23 120 kg (264 lb)   10/03/23 120 kg (265 lb)        Temp Readings from Last 3 Encounters:   05/21/24 98 °F (36.7 °C) (Temporal)   11/15/23 98.2 °F (36.8 °C) (Temporal)   10/03/23 98.8 °F (37.1 °C) (Temporal)        BP Readings from Last 3 Encounters:   05/21/24 132/70   11/15/23 134/80   10/03/23 136/80         Pulse Readings from Last 3 Encounters:   05/21/24 83   11/15/23 82   10/03/23 86     @LASTSAO2(3)@      Physical Exam      Goals and Barriers:  Current Goal: Prolong Survival from Cancer.   " Barriers: None.      Patient's Capacity to Self Care:  Patient is able to self care.    Code Status: [unfilled]  Advance Directive and Living Will:      Power of :

## 2024-10-15 ENCOUNTER — APPOINTMENT (OUTPATIENT)
Dept: LAB | Facility: HOSPITAL | Age: 69
End: 2024-10-15
Payer: COMMERCIAL

## 2024-10-15 DIAGNOSIS — D50.9 IRON DEFICIENCY ANEMIA, UNSPECIFIED IRON DEFICIENCY ANEMIA TYPE: ICD-10-CM

## 2024-10-15 DIAGNOSIS — I10 ESSENTIAL HYPERTENSION, MALIGNANT: ICD-10-CM

## 2024-10-15 LAB
ALBUMIN SERPL BCG-MCNC: 3.6 G/DL (ref 3.5–5)
ALP SERPL-CCNC: 71 U/L (ref 34–104)
ALT SERPL W P-5'-P-CCNC: 14 U/L (ref 7–52)
ANION GAP SERPL CALCULATED.3IONS-SCNC: 5 MMOL/L (ref 4–13)
AST SERPL W P-5'-P-CCNC: 18 U/L (ref 13–39)
BILIRUB SERPL-MCNC: 0.7 MG/DL (ref 0.2–1)
BUN SERPL-MCNC: 18 MG/DL (ref 5–25)
CALCIUM SERPL-MCNC: 8.4 MG/DL (ref 8.4–10.2)
CHLORIDE SERPL-SCNC: 108 MMOL/L (ref 96–108)
CO2 SERPL-SCNC: 28 MMOL/L (ref 21–32)
CREAT SERPL-MCNC: 0.92 MG/DL (ref 0.6–1.3)
ERYTHROCYTE [DISTWIDTH] IN BLOOD BY AUTOMATED COUNT: 14.2 % (ref 11.6–15.1)
GFR SERPL CREATININE-BSD FRML MDRD: 83 ML/MIN/1.73SQ M
GLUCOSE P FAST SERPL-MCNC: 100 MG/DL (ref 65–99)
HCT VFR BLD AUTO: 41.2 % (ref 36.5–49.3)
HGB BLD-MCNC: 12.8 G/DL (ref 12–17)
MCH RBC QN AUTO: 28.6 PG (ref 26.8–34.3)
MCHC RBC AUTO-ENTMCNC: 31.1 G/DL (ref 31.4–37.4)
MCV RBC AUTO: 92 FL (ref 82–98)
PLATELET # BLD AUTO: 272 THOUSANDS/UL (ref 149–390)
PMV BLD AUTO: 8.9 FL (ref 8.9–12.7)
POTASSIUM SERPL-SCNC: 4.4 MMOL/L (ref 3.5–5.3)
PROT SERPL-MCNC: 6.2 G/DL (ref 6.4–8.4)
RBC # BLD AUTO: 4.47 MILLION/UL (ref 3.88–5.62)
SODIUM SERPL-SCNC: 141 MMOL/L (ref 135–147)
WBC # BLD AUTO: 7.2 THOUSAND/UL (ref 4.31–10.16)

## 2024-10-15 PROCEDURE — 80053 COMPREHEN METABOLIC PANEL: CPT

## 2024-10-15 PROCEDURE — 85027 COMPLETE CBC AUTOMATED: CPT

## 2024-10-15 PROCEDURE — 36415 COLL VENOUS BLD VENIPUNCTURE: CPT

## 2024-11-18 ENCOUNTER — APPOINTMENT (OUTPATIENT)
Dept: LAB | Facility: HOSPITAL | Age: 69
End: 2024-11-18
Payer: COMMERCIAL

## 2024-11-18 ENCOUNTER — OFFICE VISIT (OUTPATIENT)
Dept: LAB | Facility: HOSPITAL | Age: 69
End: 2024-11-18
Payer: COMMERCIAL

## 2024-11-18 DIAGNOSIS — D57.3 SICKLE-CELL TRAIT (HCC): ICD-10-CM

## 2024-11-18 DIAGNOSIS — D50.9 IRON DEFICIENCY ANEMIA, UNSPECIFIED IRON DEFICIENCY ANEMIA TYPE: ICD-10-CM

## 2024-11-18 DIAGNOSIS — I48.91 ATRIAL FIBRILLATION, UNSPECIFIED TYPE (HCC): ICD-10-CM

## 2024-11-18 LAB
ATRIAL RATE: 79 BPM
ERYTHROCYTE [DISTWIDTH] IN BLOOD BY AUTOMATED COUNT: 14.5 % (ref 11.6–15.1)
FOLATE SERPL-MCNC: >22.3 NG/ML
HCT VFR BLD AUTO: 39.8 % (ref 36.5–49.3)
HGB BLD-MCNC: 12.6 G/DL (ref 12–17)
IRON SATN MFR SERPL: 11 % (ref 15–50)
IRON SERPL-MCNC: 46 UG/DL (ref 50–212)
MCH RBC QN AUTO: 28.1 PG (ref 26.8–34.3)
MCHC RBC AUTO-ENTMCNC: 31.7 G/DL (ref 31.4–37.4)
MCV RBC AUTO: 89 FL (ref 82–98)
P AXIS: 3 DEGREES
PLATELET # BLD AUTO: 287 THOUSANDS/UL (ref 149–390)
PMV BLD AUTO: 9.5 FL (ref 8.9–12.7)
PR INTERVAL: 170 MS
QRS AXIS: -51 DEGREES
QRSD INTERVAL: 152 MS
QT INTERVAL: 422 MS
QTC INTERVAL: 484 MS
RBC # BLD AUTO: 4.49 MILLION/UL (ref 3.88–5.62)
T WAVE AXIS: -4 DEGREES
TIBC SERPL-MCNC: 410 UG/DL (ref 250–450)
UIBC SERPL-MCNC: 364 UG/DL (ref 155–355)
VENTRICULAR RATE: 79 BPM
VIT B12 SERPL-MCNC: 290 PG/ML (ref 180–914)
WBC # BLD AUTO: 6.05 THOUSAND/UL (ref 4.31–10.16)

## 2024-11-18 PROCEDURE — 85027 COMPLETE CBC AUTOMATED: CPT

## 2024-11-18 PROCEDURE — 83550 IRON BINDING TEST: CPT

## 2024-11-18 PROCEDURE — 93010 ELECTROCARDIOGRAM REPORT: CPT | Performed by: STUDENT IN AN ORGANIZED HEALTH CARE EDUCATION/TRAINING PROGRAM

## 2024-11-18 PROCEDURE — 93005 ELECTROCARDIOGRAM TRACING: CPT

## 2024-11-18 PROCEDURE — 83540 ASSAY OF IRON: CPT

## 2024-11-18 PROCEDURE — 82607 VITAMIN B-12: CPT

## 2024-11-18 PROCEDURE — 82746 ASSAY OF FOLIC ACID SERUM: CPT

## 2024-11-18 PROCEDURE — 36415 COLL VENOUS BLD VENIPUNCTURE: CPT

## 2024-11-19 ENCOUNTER — OFFICE VISIT (OUTPATIENT)
Dept: HEMATOLOGY ONCOLOGY | Facility: CLINIC | Age: 69
End: 2024-11-19
Payer: COMMERCIAL

## 2024-11-19 VITALS
OXYGEN SATURATION: 98 % | HEIGHT: 70 IN | HEART RATE: 94 BPM | DIASTOLIC BLOOD PRESSURE: 74 MMHG | WEIGHT: 284 LBS | RESPIRATION RATE: 18 BRPM | TEMPERATURE: 97.9 F | SYSTOLIC BLOOD PRESSURE: 112 MMHG | BODY MASS INDEX: 40.66 KG/M2

## 2024-11-19 DIAGNOSIS — I82.411 ACUTE DEEP VEIN THROMBOSIS (DVT) OF FEMORAL VEIN OF RIGHT LOWER EXTREMITY (HCC): Primary | ICD-10-CM

## 2024-11-19 PROCEDURE — 99211 OFF/OP EST MAY X REQ PHY/QHP: CPT | Performed by: INTERNAL MEDICINE

## 2024-12-03 ENCOUNTER — APPOINTMENT (OUTPATIENT)
Dept: LAB | Facility: HOSPITAL | Age: 69
End: 2024-12-03
Payer: COMMERCIAL

## 2024-12-03 DIAGNOSIS — D50.9 IRON DEFICIENCY ANEMIA, UNSPECIFIED IRON DEFICIENCY ANEMIA TYPE: ICD-10-CM

## 2024-12-03 DIAGNOSIS — I10 ESSENTIAL HYPERTENSION, MALIGNANT: ICD-10-CM

## 2024-12-03 LAB
BACTERIA UR QL AUTO: NORMAL /HPF
BILIRUB UR QL STRIP: NEGATIVE
CLARITY UR: CLEAR
COLOR UR: YELLOW
GLUCOSE UR STRIP-MCNC: NEGATIVE MG/DL
HGB UR QL STRIP.AUTO: NEGATIVE
KETONES UR STRIP-MCNC: NEGATIVE MG/DL
LEUKOCYTE ESTERASE UR QL STRIP: NEGATIVE
NITRITE UR QL STRIP: NEGATIVE
NON-SQ EPI CELLS URNS QL MICRO: NORMAL /HPF
PH UR STRIP.AUTO: 6 [PH]
PROT UR STRIP-MCNC: NEGATIVE MG/DL
RBC #/AREA URNS AUTO: NORMAL /HPF
SP GR UR STRIP.AUTO: 1.02 (ref 1–1.03)
UROBILINOGEN UR STRIP-ACNC: <2 MG/DL
WBC #/AREA URNS AUTO: NORMAL /HPF

## 2024-12-03 PROCEDURE — 81001 URINALYSIS AUTO W/SCOPE: CPT

## 2024-12-17 ENCOUNTER — OFFICE VISIT (OUTPATIENT)
Dept: GASTROENTEROLOGY | Facility: CLINIC | Age: 69
End: 2024-12-17
Payer: COMMERCIAL

## 2024-12-17 VITALS
WEIGHT: 278.6 LBS | SYSTOLIC BLOOD PRESSURE: 129 MMHG | DIASTOLIC BLOOD PRESSURE: 84 MMHG | HEIGHT: 70 IN | OXYGEN SATURATION: 95 % | BODY MASS INDEX: 39.88 KG/M2 | HEART RATE: 93 BPM | TEMPERATURE: 97.2 F

## 2024-12-17 DIAGNOSIS — D68.2 HEREDITARY DEFICIENCY OF OTHER CLOTTING FACTORS (HCC): ICD-10-CM

## 2024-12-17 DIAGNOSIS — E61.1 IRON DEFICIENCY: Primary | ICD-10-CM

## 2024-12-17 PROCEDURE — 99203 OFFICE O/P NEW LOW 30 MIN: CPT | Performed by: PHYSICIAN ASSISTANT

## 2024-12-17 NOTE — PROGRESS NOTES
Name: Lalo Fry      : 1955      MRN: [id number]  Encounter Provider: Promise Romero PA-C  Encounter Date: 2024   Encounter department: [facility] [place]  :  Assessment & Plan  Iron deficiency  Newly noted on labs in November  Hgb is normal but has dropped from last year    Will plan EGD and Colonoscopy    He recently started an iron supplement        History of Present Illness   HPI  Lalo Fry is a 69 y.o. male with a history of DVT and PE, hypertension, hyperlipidemia, obesity who presents for evaluation of iron deficiency.  This is newly diagnosed on recent labs from November of this year.  Although his hemoglobin is normal at 12.6 that has dropped 3 g from last year.  He denies any obvious GI bleeding.  He has no gastrointestinal symptoms such as abdominal pain, diarrhea or constipation.  He has no history of anemia that he is aware of.  He reports having had 2 colonoscopies with Dr. Marie.  He reports having had colon polyps on the first 1 but his last colonoscopy was normal.  He is taking Eliquis for his report of blood clots.    History obtained from: patient    Review of Systems   Constitutional:  Negative for activity change, appetite change, fatigue, fever and unexpected weight change.   Respiratory:  Negative for cough, shortness of breath and wheezing.    Gastrointestinal:  Negative for abdominal distention, abdominal pain, blood in stool, constipation, diarrhea, nausea and vomiting.   Endocrine: Negative for cold intolerance and heat intolerance.   Genitourinary:  Negative for dysuria, flank pain and hematuria.   Neurological:  Negative for dizziness, numbness and headaches.     Medical History Reviewed by provider this encounter:     .  Past Medical History   Past Medical History:   Diagnosis Date    Acute respiratory failure with hypoxemia (HCC) 5/15/2023    High cholesterol     Hypertension      Past Surgical History:   Procedure  "Laterality Date    COLON SURGERY       History reviewed. No pertinent family history.   reports that he has been smoking cigars. He has never used smokeless tobacco. He reports current alcohol use of about 5.0 standard drinks of alcohol per week.  Current Outpatient Medications on File Prior to Visit   Medication Sig Dispense Refill    apixaban (Eliquis) 5 mg Take 2 tablets (10 mg total) by mouth 2 (two) times a day for 7 days, THEN 1 tablet (5 mg total) 2 (two) times a day. 74 tablet 0    atorvastatin (LIPITOR) 20 mg tablet Take 20 mg by mouth daily      diltiazem (TIAZAC) 360 MG 24 hr capsule Take 360 mg by mouth daily      losartan (COZAAR) 25 mg tablet Take 25 mg by mouth daily       No current facility-administered medications on file prior to visit.     Allergies   Allergen Reactions    Tetracycline Hives     1973        Current Outpatient Medications on File Prior to Visit   Medication Sig Dispense Refill    apixaban (Eliquis) 5 mg Take 2 tablets (10 mg total) by mouth 2 (two) times a day for 7 days, THEN 1 tablet (5 mg total) 2 (two) times a day. 74 tablet 0    atorvastatin (LIPITOR) 20 mg tablet Take 20 mg by mouth daily      diltiazem (TIAZAC) 360 MG 24 hr capsule Take 360 mg by mouth daily      losartan (COZAAR) 25 mg tablet Take 25 mg by mouth daily       No current facility-administered medications on file prior to visit.      Social History     Tobacco Use    Smoking status: Some Days     Types: Cigars    Smokeless tobacco: Never   Substance and Sexual Activity    Alcohol use: Yes     Alcohol/week: 5.0 standard drinks of alcohol     Types: 5 Standard drinks or equivalent per week    Drug use: Not on file    Sexual activity: Not on file        Objective   /84 (BP Location: Right arm, Patient Position: Sitting, Cuff Size: Large)   Pulse 93   Temp (!) 97.2 °F (36.2 °C) (Temporal)   Ht 5' 10\" (1.778 m)   Wt 126 kg (278 lb 9.6 oz)   SpO2 95%   BMI 39.97 kg/m²      Physical Exam  Vitals reviewed. "   Constitutional:       Appearance: Normal appearance.   HENT:      Head: Normocephalic and atraumatic.   Eyes:      Extraocular Movements: Extraocular movements intact.      Pupils: Pupils are equal, round, and reactive to light.   Cardiovascular:      Rate and Rhythm: Normal rate and regular rhythm.   Abdominal:      General: Bowel sounds are normal. There is no distension.      Palpations: Abdomen is soft. There is no mass.      Tenderness: There is no abdominal tenderness.   Musculoskeletal:         General: Swelling present.   Skin:     General: Skin is warm and dry.      Coloration: Skin is not jaundiced.   Neurological:      General: No focal deficit present.      Mental Status: He is alert and oriented to person, place, and time.

## 2024-12-17 NOTE — LETTER
2024     Demetra Stratton MD  [address]  [address]  [facility] [address]    Patient: Lalo Fry   YOB: 1955   Date of Visit: 2024       Dear Dr. Stratton:    Thank you for referring Lalo Fry to me for evaluation. Below are my notes for this consultation.    If you have questions, please do not hesitate to call me. I look forward to following your patient along with you.         Sincerely,        Promise Romero PA-C        CC: No Recipients    Promise Romero PA-C  2024  8:50 AM  Signed  Name: Lalo Fry      : 1955      MRN: [id number]  Encounter Provider: Promise Romero PA-C  Encounter Date: 2024   Encounter department: [facility] [place]  :  Assessment & Plan  Iron deficiency  Newly noted on labs in November  Hgb is normal but has dropped from last year    Will plan EGD and Colonoscopy    He recently started an iron supplement        History of Present Illness  HPI  Lalo Fry is a 69 y.o. male with a history of DVT and PE, hypertension, hyperlipidemia, obesity who presents for evaluation of iron deficiency.  This is newly diagnosed on recent labs from November of this year.  Although his hemoglobin is normal at 12.6 that has dropped 3 g from last year.  He denies any obvious GI bleeding.  He has no gastrointestinal symptoms such as abdominal pain, diarrhea or constipation.  He has no history of anemia that he is aware of.  He reports having had 2 colonoscopies with Dr. Marie.  He reports having had colon polyps on the first 1 but his last colonoscopy was normal.  He is taking Eliquis for his report of blood clots.    History obtained from: patient    Review of Systems   Constitutional:  Negative for activity change, appetite change, fatigue, fever and unexpected weight change.   Respiratory:  Negative for cough, shortness of breath and wheezing.    Gastrointestinal:  Negative for  abdominal distention, abdominal pain, blood in stool, constipation, diarrhea, nausea and vomiting.   Endocrine: Negative for cold intolerance and heat intolerance.   Genitourinary:  Negative for dysuria, flank pain and hematuria.   Neurological:  Negative for dizziness, numbness and headaches.     Medical History Reviewed by provider this encounter:     .  Past Medical History  Past Medical History:   Diagnosis Date   • Acute respiratory failure with hypoxemia (HCC) 5/15/2023   • High cholesterol    • Hypertension      Past Surgical History:   Procedure Laterality Date   • COLON SURGERY       History reviewed. No pertinent family history.   reports that he has been smoking cigars. He has never used smokeless tobacco. He reports current alcohol use of about 5.0 standard drinks of alcohol per week.  Current Outpatient Medications on File Prior to Visit   Medication Sig Dispense Refill   • apixaban (Eliquis) 5 mg Take 2 tablets (10 mg total) by mouth 2 (two) times a day for 7 days, THEN 1 tablet (5 mg total) 2 (two) times a day. 74 tablet 0   • atorvastatin (LIPITOR) 20 mg tablet Take 20 mg by mouth daily     • diltiazem (TIAZAC) 360 MG 24 hr capsule Take 360 mg by mouth daily     • losartan (COZAAR) 25 mg tablet Take 25 mg by mouth daily       No current facility-administered medications on file prior to visit.     Allergies   Allergen Reactions   • Tetracycline Hives     1973        Current Outpatient Medications on File Prior to Visit   Medication Sig Dispense Refill   • apixaban (Eliquis) 5 mg Take 2 tablets (10 mg total) by mouth 2 (two) times a day for 7 days, THEN 1 tablet (5 mg total) 2 (two) times a day. 74 tablet 0   • atorvastatin (LIPITOR) 20 mg tablet Take 20 mg by mouth daily     • diltiazem (TIAZAC) 360 MG 24 hr capsule Take 360 mg by mouth daily     • losartan (COZAAR) 25 mg tablet Take 25 mg by mouth daily       No current facility-administered medications on file prior to visit.      Social History  "    Tobacco Use   • Smoking status: Some Days     Types: Cigars   • Smokeless tobacco: Never   Substance and Sexual Activity   • Alcohol use: Yes     Alcohol/week: 5.0 standard drinks of alcohol     Types: 5 Standard drinks or equivalent per week   • Drug use: Not on file   • Sexual activity: Not on file        Objective  /84 (BP Location: Right arm, Patient Position: Sitting, Cuff Size: Large)   Pulse 93   Temp (!) 97.2 °F (36.2 °C) (Temporal)   Ht 5' 10\" (1.778 m)   Wt 126 kg (278 lb 9.6 oz)   SpO2 95%   BMI 39.97 kg/m²      Physical Exam  Vitals reviewed.   Constitutional:       Appearance: Normal appearance.   HENT:      Head: Normocephalic and atraumatic.   Eyes:      Extraocular Movements: Extraocular movements intact.      Pupils: Pupils are equal, round, and reactive to light.   Cardiovascular:      Rate and Rhythm: Normal rate and regular rhythm.   Abdominal:      General: Bowel sounds are normal. There is no distension.      Palpations: Abdomen is soft. There is no mass.      Tenderness: There is no abdominal tenderness.   Musculoskeletal:         General: Swelling present.   Skin:     General: Skin is warm and dry.      Coloration: Skin is not jaundiced.   Neurological:      General: No focal deficit present.      Mental Status: He is alert and oriented to person, place, and time.           "

## 2024-12-17 NOTE — PATIENT INSTRUCTIONS
Scheduled date of EGD/colonoscopy (as of today):12/30/24  Physician performing EGD/colonoscopy:Gary  Location of EGD/colonoscopy:[facility]  Desired bowel prep reviewed with patient:Massimo/Miralax  Instructions reviewed with patient by:Navin adrian  Clearances:  none

## 2024-12-30 ENCOUNTER — TREATMENT (OUTPATIENT)
Dept: GASTROENTEROLOGY | Facility: CLINIC | Age: 69
End: 2024-12-30

## 2024-12-30 ENCOUNTER — ANESTHESIA (OUTPATIENT)
Dept: GASTROENTEROLOGY | Facility: HOSPITAL | Age: 69
End: 2024-12-30
Payer: COMMERCIAL

## 2024-12-30 ENCOUNTER — HOSPITAL ENCOUNTER (OUTPATIENT)
Dept: GASTROENTEROLOGY | Facility: HOSPITAL | Age: 69
Setting detail: OUTPATIENT SURGERY
Discharge: HOME | End: 2024-12-30
Payer: COMMERCIAL

## 2024-12-30 ENCOUNTER — ANESTHESIA EVENT (OUTPATIENT)
Dept: GASTROENTEROLOGY | Facility: HOSPITAL | Age: 69
End: 2024-12-30
Payer: COMMERCIAL

## 2024-12-30 VITALS
RESPIRATION RATE: 16 BRPM | BODY MASS INDEX: 38.82 KG/M2 | OXYGEN SATURATION: 97 % | WEIGHT: 271.17 LBS | DIASTOLIC BLOOD PRESSURE: 59 MMHG | HEIGHT: 70 IN | HEART RATE: 66 BPM | SYSTOLIC BLOOD PRESSURE: 106 MMHG | TEMPERATURE: 97.6 F

## 2024-12-30 DIAGNOSIS — D50.8 OTHER IRON DEFICIENCY ANEMIA: Primary | ICD-10-CM

## 2024-12-30 DIAGNOSIS — E61.1 IRON DEFICIENCY: ICD-10-CM

## 2024-12-30 PROCEDURE — 88342 IMHCHEM/IMCYTCHM 1ST ANTB: CPT | Performed by: PATHOLOGY

## 2024-12-30 PROCEDURE — 88305 TISSUE EXAM BY PATHOLOGIST: CPT | Performed by: PATHOLOGY

## 2024-12-30 PROCEDURE — 43239 EGD BIOPSY SINGLE/MULTIPLE: CPT | Performed by: INTERNAL MEDICINE

## 2024-12-30 PROCEDURE — 88313 SPECIAL STAINS GROUP 2: CPT | Performed by: PATHOLOGY

## 2024-12-30 PROCEDURE — 45385 COLONOSCOPY W/LESION REMOVAL: CPT | Performed by: INTERNAL MEDICINE

## 2024-12-30 RX ORDER — SODIUM CHLORIDE, SODIUM LACTATE, POTASSIUM CHLORIDE, CALCIUM CHLORIDE 600; 310; 30; 20 MG/100ML; MG/100ML; MG/100ML; MG/100ML
100 INJECTION, SOLUTION INTRAVENOUS CONTINUOUS
Status: DISCONTINUED | OUTPATIENT
Start: 2024-12-30 | End: 2025-01-03 | Stop reason: HOSPADM

## 2024-12-30 RX ORDER — LIDOCAINE HYDROCHLORIDE 20 MG/ML
INJECTION, SOLUTION EPIDURAL; INFILTRATION; INTRACAUDAL; PERINEURAL AS NEEDED
Status: DISCONTINUED | OUTPATIENT
Start: 2024-12-30 | End: 2024-12-30

## 2024-12-30 RX ORDER — PROPOFOL 10 MG/ML
INJECTION, EMULSION INTRAVENOUS AS NEEDED
Status: DISCONTINUED | OUTPATIENT
Start: 2024-12-30 | End: 2024-12-30

## 2024-12-30 RX ADMIN — PROPOFOL 40 MG: 10 INJECTION, EMULSION INTRAVENOUS at 08:57

## 2024-12-30 RX ADMIN — PROPOFOL 20 MG: 10 INJECTION, EMULSION INTRAVENOUS at 09:19

## 2024-12-30 RX ADMIN — PROPOFOL 20 MG: 10 INJECTION, EMULSION INTRAVENOUS at 09:01

## 2024-12-30 RX ADMIN — PROPOFOL 40 MG: 10 INJECTION, EMULSION INTRAVENOUS at 08:59

## 2024-12-30 RX ADMIN — LIDOCAINE HYDROCHLORIDE 40 MG: 20 INJECTION, SOLUTION EPIDURAL; INFILTRATION; INTRACAUDAL; PERINEURAL at 08:53

## 2024-12-30 RX ADMIN — PROPOFOL 40 MG: 10 INJECTION, EMULSION INTRAVENOUS at 09:21

## 2024-12-30 RX ADMIN — PROPOFOL 20 MG: 10 INJECTION, EMULSION INTRAVENOUS at 08:54

## 2024-12-30 RX ADMIN — PROPOFOL 20 MG: 10 INJECTION, EMULSION INTRAVENOUS at 09:13

## 2024-12-30 RX ADMIN — PROPOFOL 20 MG: 10 INJECTION, EMULSION INTRAVENOUS at 09:15

## 2024-12-30 RX ADMIN — PROPOFOL 20 MG: 10 INJECTION, EMULSION INTRAVENOUS at 09:09

## 2024-12-30 RX ADMIN — PROPOFOL 40 MG: 10 INJECTION, EMULSION INTRAVENOUS at 09:17

## 2024-12-30 RX ADMIN — PROPOFOL 20 MG: 10 INJECTION, EMULSION INTRAVENOUS at 09:03

## 2024-12-30 RX ADMIN — PROPOFOL 20 MG: 10 INJECTION, EMULSION INTRAVENOUS at 09:11

## 2024-12-30 RX ADMIN — PROPOFOL 20 MG: 10 INJECTION, EMULSION INTRAVENOUS at 09:05

## 2024-12-30 RX ADMIN — LIDOCAINE HYDROCHLORIDE 60 MG: 20 INJECTION, SOLUTION EPIDURAL; INFILTRATION; INTRACAUDAL; PERINEURAL at 08:44

## 2024-12-30 RX ADMIN — PROPOFOL 40 MG: 10 INJECTION, EMULSION INTRAVENOUS at 08:55

## 2024-12-30 RX ADMIN — SODIUM CHLORIDE, SODIUM LACTATE, POTASSIUM CHLORIDE, AND CALCIUM CHLORIDE: .6; .31; .03; .02 INJECTION, SOLUTION INTRAVENOUS at 08:30

## 2024-12-30 RX ADMIN — PROPOFOL 100 MG: 10 INJECTION, EMULSION INTRAVENOUS at 08:53

## 2024-12-30 RX ADMIN — PROPOFOL 20 MG: 10 INJECTION, EMULSION INTRAVENOUS at 09:07

## 2024-12-30 NOTE — ANESTHESIA PREPROCEDURE EVALUATION
Procedure:  COLONOSCOPY  EGD    Relevant Problems   ANESTHESIA (within normal limits)      CARDIO   (+) HLD (hyperlipidemia)   (+) HTN (hypertension)   (-) MI (myocardial infarction) (HCC)      ENDO (within normal limits)      GI/HEPATIC (within normal limits)  NPO confirmed  BMI 38.9      HEMATOLOGY   (+) Hereditary deficiency of other clotting factors (HCC)      MUSCULOSKELETAL (within normal limits)      NEURO/PSYCH (within normal limits)   (-) CVA (cerebral vascular accident) (HCC)      PULMONARY (within normal limits)   (-) Sleep apnea   (-) URI (upper respiratory infection)   -Eliquis 4d prior (Thurs)     Allergies   Allergen Reactions    Tetracycline Hives     1973       Social History     Tobacco Use    Smoking status: Some Days     Types: Cigars    Smokeless tobacco: Never   Substance Use Topics    Alcohol use: Yes     Alcohol/week: 5.0 standard drinks of alcohol     Types: 5 Standard drinks or equivalent per week    Drug use: Never     Current Outpatient Medications   Medication Instructions    apixaban (Eliquis) 5 mg Take 2 tablets (10 mg total) by mouth 2 (two) times a day for 7 days, THEN 1 tablet (5 mg total) 2 (two) times a day.    atorvastatin (LIPITOR) 20 mg, Daily    diltiazem (TIAZAC) 360 mg, Daily    losartan (COZAAR) 25 mg, Daily     Lab Results   Component Value Date    WBC 6.05 11/18/2024    HGB 12.6 11/18/2024    HCT 39.8 11/18/2024     11/18/2024    SODIUM 141 10/15/2024    K 4.4 10/15/2024     10/15/2024    CO2 28 10/15/2024    BUN 18 10/15/2024    CREATININE 0.92 10/15/2024    GLUC 93 05/18/2023    AST 18 10/15/2024    ALT 14 10/15/2024    ALKPHOS 71 10/15/2024    TBILI 0.70 10/15/2024    ALB 3.6 10/15/2024    PROTIME 12.6 05/15/2023    PTT 57 (H) 05/16/2023    INR 0.96 05/15/2023     Vitals:    12/30/24 0726   BP: 130/71   Pulse: 88   Resp: 18   Temp: 97.5 °F (36.4 °C)   SpO2: 99%     EKG 11/18/24  Normal sinus rhythm  Left axis deviation  Right bundle branch block  When  compared with ECG of 15-May-2023 15:14,  Criteria for Septal infarct are no longer Present  Confirmed by Misael Beaulieu ([id number]) on 11/18/2024 3:18:01 PM    Echo 5/15/23    Technically difficult study.    Left Ventricle: Left ventricular cavity size is normal. Wall thickness is normal. Systolic function is normal (60%). Wall motion cannot be accurately assessed.    Right Ventricle: Right ventricular cavity size is dilated. Systolic function is normal.    Right Atrium: The atrium is mildly dilated.    Tricuspid Valve: There is mild regurgitation. The right ventricular systolic pressure is moderately to severely elevated (68 mm Hg).    IVC/SVC: The inferior vena cava is dilated. Respirophasic changes were blunted (less than 50% variation). RA pressure 15 mm Hg.    Physical Exam    Airway  Comment: Large meade  Mallampati score: II  TM Distance: >3 FB  Neck ROM: full     Dental   No notable dental hx     Cardiovascular  Cardiovascular exam normal    Pulmonary  Pulmonary exam normal     Other Findings        Anesthesia Plan  ASA Score- 3     Anesthesia Type- IV sedation with anesthesia with ASA Monitors.         Additional Monitors:     Airway Plan:     Comment: O2 mask, natural airway, EtCO2 monitor. Risks discussed including awareness, aspiration, drug reactions and conversion to GA..       Plan Factors-Exercise tolerance (METS): >4 METS.    Chart reviewed. EKG reviewed.  Existing labs reviewed. Patient summary reviewed.    Patient is not a current smoker.              Induction- intravenous.    Postoperative Plan-         Informed Consent- Anesthetic plan and risks discussed with patient.  I personally reviewed this patient with the CRNA. Discussed and agreed on the Anesthesia Plan with the CRNA..

## 2024-12-30 NOTE — H&P
"history and Physical -  [facility]  Lalo Fry 69 y.o. male MRN: [id number]      HPI: Lalo Fry is a 69 y.o. year old male who presents for the evaluation of an iron patient's anemia      REVIEW OF SYSTEMS: Per the HPI, and otherwise unremarkable.    Historical Information   Past Medical History:   Diagnosis Date    Acute respiratory failure with hypoxemia (HCC) 05/15/2023    Colon polyp     High cholesterol     Hypertension      Past Surgical History:   Procedure Laterality Date    COLON SURGERY       Social History   Social History     Substance and Sexual Activity   Alcohol Use Yes    Alcohol/week: 5.0 standard drinks of alcohol    Types: 5 Standard drinks or equivalent per week     Social History     Substance and Sexual Activity   Drug Use Never     Social History     Tobacco Use   Smoking Status Some Days    Types: Cigars   Smokeless Tobacco Never     History reviewed. No pertinent family history.    Meds/Allergies     Not in a hospital admission.    Allergies   Allergen Reactions    Tetracycline Hives     1975         Objective     Blood pressure 130/71, pulse 88, temperature 97.5 °F (36.4 °C), temperature source Temporal, resp. rate 18, height 5' 10\" (1.778 m), weight 123 kg (271 lb 2.7 oz), SpO2 99%.      PHYSICAL EXAM    Gen: NAD  CV: RRR  CHEST: Clear  ABD: soft, NT/ND  EXT: no edema      ASSESSMENT/PLAN:  This is a 69 y.o. year old male here for EGD, colonoscopy, and he is stable and optimized for his procedure.          "

## 2024-12-30 NOTE — ANESTHESIA POSTPROCEDURE EVALUATION
Post-Op Assessment Note    CV Status:  Stable  Pain Score: 0    Pain management: adequate       Mental Status:  Arousable and sleepy   Hydration Status:  Euvolemic   PONV Controlled:  Controlled   Airway Patency:  Patent     Post Op Vitals Reviewed: Yes    No anethesia notable event occurred.    Staff: CRNA           Last Filed PACU Vitals:  Vitals Value Taken Time   Temp     Pulse 84    BP 97/63    Resp 18    SpO2 98% RA        Modified Gema:  Activity: 2 (12/30/2024  7:27 AM)  Respiration: 2 (12/30/2024  7:27 AM)  Circulation: 2 (12/30/2024  7:27 AM)  Consciousness: 2 (12/30/2024  7:27 AM)  Oxygen Saturation: 2 (12/30/2024  7:27 AM)  Modified Gema Score: 10 (12/30/2024  7:27 AM)

## 2025-01-03 PROCEDURE — 88305 TISSUE EXAM BY PATHOLOGIST: CPT | Performed by: PATHOLOGY

## 2025-01-03 PROCEDURE — 88313 SPECIAL STAINS GROUP 2: CPT | Performed by: PATHOLOGY

## 2025-01-03 PROCEDURE — 88342 IMHCHEM/IMCYTCHM 1ST ANTB: CPT | Performed by: PATHOLOGY

## 2025-01-06 ENCOUNTER — RESULTS FOLLOW-UP (OUTPATIENT)
Dept: GASTROENTEROLOGY | Facility: CLINIC | Age: 70
End: 2025-01-06

## 2025-01-27 ENCOUNTER — HOSPITAL ENCOUNTER (OUTPATIENT)
Dept: CT IMAGING | Facility: HOSPITAL | Age: 70
Discharge: HOME | End: 2025-01-27
Attending: INTERNAL MEDICINE
Payer: COMMERCIAL

## 2025-01-27 DIAGNOSIS — D50.8 OTHER IRON DEFICIENCY ANEMIA: ICD-10-CM

## 2025-01-27 PROCEDURE — 74261 CT COLONOGRAPHY DX: CPT

## 2025-02-03 ENCOUNTER — RESULTS FOLLOW-UP (OUTPATIENT)
Dept: GASTROENTEROLOGY | Facility: CLINIC | Age: 70
End: 2025-02-03

## 2025-02-06 ENCOUNTER — PREP FOR PROCEDURE (OUTPATIENT)
Dept: GASTROENTEROLOGY | Facility: CLINIC | Age: 70
End: 2025-02-06

## 2025-02-06 DIAGNOSIS — Z86.0100 HISTORY OF COLON POLYPS: Primary | ICD-10-CM

## 2025-02-06 NOTE — TELEPHONE ENCOUNTER
----- Message from Wagner Vega DO sent at 2/3/2025  1:05 PM EST -----  I called Lalo and told him that he has a 2.2 cm polypoid lesion in the cecum.  We will need to go after this with a repeat colonoscopy.  Navin, please reschedule this colonoscopy within the next 2 months.

## 2025-02-06 NOTE — TELEPHONE ENCOUNTER
Spoke with the patient and he scheduled his colonoscopy. Prep instructions sent via [employer].    Scheduled date of colonoscopy (as of today):4/1/25  Physician performing colonoscopy:Gary  Location of colonoscopy:[facility]  Bowel prep reviewed with patient:miralax/dulcolax  Instructions reviewed with patient by:Renetta PECK  Clearances: none    Eliquis 2 day hold

## 2025-04-01 ENCOUNTER — ANESTHESIA EVENT (OUTPATIENT)
Dept: GASTROENTEROLOGY | Facility: HOSPITAL | Age: 70
End: 2025-04-01
Payer: COMMERCIAL

## 2025-04-01 ENCOUNTER — ANESTHESIA (OUTPATIENT)
Dept: GASTROENTEROLOGY | Facility: HOSPITAL | Age: 70
End: 2025-04-01
Payer: COMMERCIAL

## 2025-04-01 ENCOUNTER — HOSPITAL ENCOUNTER (OUTPATIENT)
Dept: GASTROENTEROLOGY | Facility: HOSPITAL | Age: 70
Setting detail: OUTPATIENT SURGERY
Discharge: HOME | End: 2025-04-01
Attending: INTERNAL MEDICINE
Payer: COMMERCIAL

## 2025-04-01 VITALS
DIASTOLIC BLOOD PRESSURE: 60 MMHG | HEART RATE: 69 BPM | HEIGHT: 70 IN | BODY MASS INDEX: 38.13 KG/M2 | TEMPERATURE: 97 F | RESPIRATION RATE: 15 BRPM | WEIGHT: 266.32 LBS | SYSTOLIC BLOOD PRESSURE: 131 MMHG | OXYGEN SATURATION: 100 %

## 2025-04-01 DIAGNOSIS — Z86.0100 HISTORY OF COLON POLYPS: ICD-10-CM

## 2025-04-01 PROCEDURE — 88305 TISSUE EXAM BY PATHOLOGIST: CPT | Performed by: SPECIALIST

## 2025-04-01 RX ORDER — SODIUM CHLORIDE, SODIUM LACTATE, POTASSIUM CHLORIDE, CALCIUM CHLORIDE 600; 310; 30; 20 MG/100ML; MG/100ML; MG/100ML; MG/100ML
75 INJECTION, SOLUTION INTRAVENOUS CONTINUOUS
Status: DISCONTINUED | OUTPATIENT
Start: 2025-04-01 | End: 2025-04-05 | Stop reason: HOSPADM

## 2025-04-01 RX ORDER — PROPOFOL 10 MG/ML
INJECTION, EMULSION INTRAVENOUS AS NEEDED
Status: DISCONTINUED | OUTPATIENT
Start: 2025-04-01 | End: 2025-04-01

## 2025-04-01 RX ORDER — SODIUM CHLORIDE, SODIUM LACTATE, POTASSIUM CHLORIDE, CALCIUM CHLORIDE 600; 310; 30; 20 MG/100ML; MG/100ML; MG/100ML; MG/100ML
INJECTION, SOLUTION INTRAVENOUS CONTINUOUS PRN
Status: DISCONTINUED | OUTPATIENT
Start: 2025-04-01 | End: 2025-04-01

## 2025-04-01 RX ORDER — FERROUS SULFATE 325(65) MG
325 TABLET ORAL
COMMUNITY

## 2025-04-01 RX ORDER — LIDOCAINE HYDROCHLORIDE 20 MG/ML
INJECTION, SOLUTION EPIDURAL; INFILTRATION; INTRACAUDAL; PERINEURAL AS NEEDED
Status: DISCONTINUED | OUTPATIENT
Start: 2025-04-01 | End: 2025-04-01

## 2025-04-01 RX ADMIN — SODIUM CHLORIDE, SODIUM LACTATE, POTASSIUM CHLORIDE, AND CALCIUM CHLORIDE: .6; .31; .03; .02 INJECTION, SOLUTION INTRAVENOUS at 09:08

## 2025-04-01 RX ADMIN — PROPOFOL 50 MG: 10 INJECTION, EMULSION INTRAVENOUS at 10:00

## 2025-04-01 RX ADMIN — LIDOCAINE HYDROCHLORIDE 100 MG: 20 INJECTION, SOLUTION EPIDURAL; INFILTRATION; INTRACAUDAL; PERINEURAL at 09:44

## 2025-04-01 RX ADMIN — PROPOFOL 50 MG: 10 INJECTION, EMULSION INTRAVENOUS at 09:50

## 2025-04-01 RX ADMIN — LIDOCAINE HYDROCHLORIDE 40 MG: 20 INJECTION, SOLUTION EPIDURAL; INFILTRATION; INTRACAUDAL; PERINEURAL at 10:08

## 2025-04-01 RX ADMIN — PROPOFOL 100 MG: 10 INJECTION, EMULSION INTRAVENOUS at 09:44

## 2025-04-01 RX ADMIN — PROPOFOL 50 MG: 10 INJECTION, EMULSION INTRAVENOUS at 10:04

## 2025-04-01 RX ADMIN — PROPOFOL 50 MG: 10 INJECTION, EMULSION INTRAVENOUS at 09:48

## 2025-04-01 RX ADMIN — PROPOFOL 50 MG: 10 INJECTION, EMULSION INTRAVENOUS at 09:46

## 2025-04-01 RX ADMIN — PROPOFOL 50 MG: 10 INJECTION, EMULSION INTRAVENOUS at 09:45

## 2025-04-01 RX ADMIN — PROPOFOL 50 MG: 10 INJECTION, EMULSION INTRAVENOUS at 10:08

## 2025-04-01 RX ADMIN — PROPOFOL 50 MG: 10 INJECTION, EMULSION INTRAVENOUS at 09:47

## 2025-04-01 RX ADMIN — PROPOFOL 50 MG: 10 INJECTION, EMULSION INTRAVENOUS at 09:54

## 2025-04-01 RX ADMIN — PROPOFOL 50 MG: 10 INJECTION, EMULSION INTRAVENOUS at 09:51

## 2025-04-01 RX ADMIN — SODIUM CHLORIDE, SODIUM LACTATE, POTASSIUM CHLORIDE, AND CALCIUM CHLORIDE 75 ML/HR: .6; .31; .03; .02 INJECTION, SOLUTION INTRAVENOUS at 08:53

## 2025-04-01 RX ADMIN — PROPOFOL 50 MG: 10 INJECTION, EMULSION INTRAVENOUS at 09:57

## 2025-04-01 NOTE — ANESTHESIA PREPROCEDURE EVALUATION
Procedure:  COLONOSCOPY    Relevant Problems   CARDIO   (+) Acute deep vein thrombosis (DVT) of femoral vein of right lower extremity (HCC)   (+) HLD (hyperlipidemia)   (+) HTN (hypertension)   (+) Pulmonary embolism with acute cor pulmonale (HCC)      /RENAL   (+) LULI (acute kidney injury) (HCC)      HEMATOLOGY   (+) Hereditary deficiency of other clotting factors (HCC)       TTE 5/2023     Technically difficult study.    Left Ventricle: Left ventricular cavity size is normal. Wall thickness is normal. Systolic function is normal (60%). Wall motion cannot be accurately assessed.    Right Ventricle: Right ventricular cavity size is dilated. Systolic function is normal.    Right Atrium: The atrium is mildly dilated.    Tricuspid Valve: There is mild regurgitation. The right ventricular systolic pressure is moderately to severely elevated (68 mm Hg).    IVC/SVC: The inferior vena cava is dilated. Respirophasic changes were blunted (less than 50% variation). RA pressure 15 mm Hg.      11/2024 EKG  Normal sinus rhythm  Left axis deviation  Right bundle branch block    Physical Exam    Airway    Mallampati score: II  TM Distance: >3 FB  Neck ROM: full     Dental   No notable dental hx     Cardiovascular  Rhythm: regular, No weak pulses    Pulmonary   No stridor    Other Findings        Anesthesia Plan  ASA Score- 3     Anesthesia Type- IV sedation with anesthesia with ASA Monitors.         Additional Monitors:     Airway Plan:            Plan Factors-    Chart reviewed.   Existing labs reviewed. Patient summary reviewed.                  Induction- intravenous.    Postoperative Plan-         Informed Consent- Anesthetic plan and risks discussed with patient.  I personally reviewed this patient with the CRNA. Discussed and agreed on the Anesthesia Plan with the CRNA..      NPO Status:  Vitals Value Taken Time   Date of last liquid 03/31/25 04/01/25 0830   Time of last liquid 2200 04/01/25 0830   Date of last solid  03/28/25 04/01/25 0830   Time of last solid 1900 04/01/25 0830

## 2025-04-01 NOTE — ANESTHESIA POSTPROCEDURE EVALUATION
Post-Op Assessment Note    CV Status:  Stable    Pain management: adequate       Mental Status:  Sleepy and arousable   Hydration Status:  Euvolemic   PONV Controlled:  Controlled   Airway Patency:  Patent     Post Op Vitals Reviewed: Yes    No anethesia notable event occurred.    Staff: CRNA           Last Filed PACU Vitals:  Vitals Value Taken Time   Temp 97 °F (36.1 °C) 04/01/25 1015   Pulse 74 04/01/25 1015   /59 04/01/25 1015   Resp 20 04/01/25 1015   SpO2 95 % 04/01/25 1015

## 2025-04-03 PROCEDURE — 88305 TISSUE EXAM BY PATHOLOGIST: CPT | Performed by: SPECIALIST

## 2025-04-06 ENCOUNTER — RESULTS FOLLOW-UP (OUTPATIENT)
Dept: GASTROENTEROLOGY | Facility: CLINIC | Age: 70
End: 2025-04-06

## 2025-06-24 ENCOUNTER — APPOINTMENT (OUTPATIENT)
Dept: LAB | Facility: HOSPITAL | Age: 70
End: 2025-06-24
Payer: COMMERCIAL

## 2025-06-24 DIAGNOSIS — Z12.5 SPECIAL SCREENING FOR MALIGNANT NEOPLASM OF PROSTATE: ICD-10-CM

## 2025-06-24 DIAGNOSIS — Z00.00 ROUTINE GENERAL MEDICAL EXAMINATION AT A HEALTH CARE FACILITY: ICD-10-CM

## 2025-06-24 LAB
ALBUMIN SERPL BCG-MCNC: 3.6 G/DL (ref 3.5–5)
ALP SERPL-CCNC: 73 U/L (ref 34–104)
ALT SERPL W P-5'-P-CCNC: 12 U/L (ref 7–52)
ANION GAP SERPL CALCULATED.3IONS-SCNC: 6 MMOL/L (ref 4–13)
AST SERPL W P-5'-P-CCNC: 17 U/L (ref 13–39)
BACTERIA UR QL AUTO: ABNORMAL /HPF
BASOPHILS # BLD AUTO: 0.09 THOUSANDS/ÂΜL (ref 0–0.1)
BASOPHILS NFR BLD AUTO: 1 % (ref 0–1)
BILIRUB SERPL-MCNC: 0.85 MG/DL (ref 0.2–1)
BILIRUB UR QL STRIP: NEGATIVE
BUN SERPL-MCNC: 16 MG/DL (ref 5–25)
CALCIUM SERPL-MCNC: 9 MG/DL (ref 8.4–10.2)
CHLORIDE SERPL-SCNC: 110 MMOL/L (ref 96–108)
CHOLEST SERPL-MCNC: 142 MG/DL (ref ?–200)
CLARITY UR: ABNORMAL
CO2 SERPL-SCNC: 26 MMOL/L (ref 21–32)
COLOR UR: YELLOW
CREAT SERPL-MCNC: 0.95 MG/DL (ref 0.6–1.3)
EOSINOPHIL # BLD AUTO: 0.08 THOUSAND/ÂΜL (ref 0–0.61)
EOSINOPHIL NFR BLD AUTO: 1 % (ref 0–6)
ERYTHROCYTE [DISTWIDTH] IN BLOOD BY AUTOMATED COUNT: 14.6 % (ref 11.6–15.1)
EST. AVERAGE GLUCOSE BLD GHB EST-MCNC: 105 MG/DL
GFR SERPL CREATININE-BSD FRML MDRD: 79 ML/MIN/1.73SQ M
GLUCOSE P FAST SERPL-MCNC: 95 MG/DL (ref 65–99)
GLUCOSE UR STRIP-MCNC: NEGATIVE MG/DL
HBA1C MFR BLD: 5.3 %
HCT VFR BLD AUTO: 45.3 % (ref 36.5–49.3)
HDLC SERPL-MCNC: 69 MG/DL
HGB BLD-MCNC: 14.5 G/DL (ref 12–17)
HGB UR QL STRIP.AUTO: NEGATIVE
IMM GRANULOCYTES # BLD AUTO: 0.11 THOUSAND/UL (ref 0–0.2)
IMM GRANULOCYTES NFR BLD AUTO: 2 % (ref 0–2)
KETONES UR STRIP-MCNC: NEGATIVE MG/DL
LDLC SERPL CALC-MCNC: 61 MG/DL (ref 0–100)
LEUKOCYTE ESTERASE UR QL STRIP: ABNORMAL
LYMPHOCYTES # BLD AUTO: 1.19 THOUSANDS/ÂΜL (ref 0.6–4.47)
LYMPHOCYTES NFR BLD AUTO: 17 % (ref 14–44)
MCH RBC QN AUTO: 29.1 PG (ref 26.8–34.3)
MCHC RBC AUTO-ENTMCNC: 32 G/DL (ref 31.4–37.4)
MCV RBC AUTO: 91 FL (ref 82–98)
MONOCYTES # BLD AUTO: 0.65 THOUSAND/ÂΜL (ref 0.17–1.22)
MONOCYTES NFR BLD AUTO: 9 % (ref 4–12)
MUCOUS THREADS UR QL AUTO: ABNORMAL
NEUTROPHILS # BLD AUTO: 4.8 THOUSANDS/ÂΜL (ref 1.85–7.62)
NEUTS SEG NFR BLD AUTO: 70 % (ref 43–75)
NITRITE UR QL STRIP: NEGATIVE
NON-SQ EPI CELLS URNS QL MICRO: ABNORMAL /HPF
NONHDLC SERPL-MCNC: 73 MG/DL
NRBC BLD AUTO-RTO: 0 /100 WBCS
PH UR STRIP.AUTO: 6 [PH]
PLATELET # BLD AUTO: 300 THOUSANDS/UL (ref 149–390)
PMV BLD AUTO: 9.3 FL (ref 8.9–12.7)
POTASSIUM SERPL-SCNC: 4.4 MMOL/L (ref 3.5–5.3)
PROT SERPL-MCNC: 6.5 G/DL (ref 6.4–8.4)
PROT UR STRIP-MCNC: NEGATIVE MG/DL
PSA SERPL-MCNC: 4 NG/ML (ref 0–4)
RBC # BLD AUTO: 4.98 MILLION/UL (ref 3.88–5.62)
RBC #/AREA URNS AUTO: ABNORMAL /HPF
SODIUM SERPL-SCNC: 142 MMOL/L (ref 135–147)
SP GR UR STRIP.AUTO: 1.01 (ref 1–1.03)
TRIGL SERPL-MCNC: 58 MG/DL (ref ?–150)
TSH SERPL DL<=0.05 MIU/L-ACNC: 1.33 UIU/ML (ref 0.45–4.5)
UROBILINOGEN UR STRIP-ACNC: <2 MG/DL
VIT B12 SERPL-MCNC: 364 PG/ML (ref 180–914)
WBC # BLD AUTO: 6.92 THOUSAND/UL (ref 4.31–10.16)
WBC #/AREA URNS AUTO: ABNORMAL /HPF

## 2025-06-24 PROCEDURE — 83036 HEMOGLOBIN GLYCOSYLATED A1C: CPT

## 2025-06-24 PROCEDURE — 80061 LIPID PANEL: CPT

## 2025-06-24 PROCEDURE — G0103 PSA SCREENING: HCPCS

## 2025-06-24 PROCEDURE — 85025 COMPLETE CBC W/AUTO DIFF WBC: CPT

## 2025-06-24 PROCEDURE — 80053 COMPREHEN METABOLIC PANEL: CPT

## 2025-06-24 PROCEDURE — 82607 VITAMIN B-12: CPT

## 2025-06-24 PROCEDURE — 84443 ASSAY THYROID STIM HORMONE: CPT

## 2025-06-24 PROCEDURE — 81001 URINALYSIS AUTO W/SCOPE: CPT

## 2025-06-24 PROCEDURE — 36415 COLL VENOUS BLD VENIPUNCTURE: CPT

## 2025-07-22 ENCOUNTER — APPOINTMENT (OUTPATIENT)
Dept: LAB | Facility: HOSPITAL | Age: 70
End: 2025-07-22
Payer: COMMERCIAL

## 2025-07-22 DIAGNOSIS — N39.0 URINARY TRACT INFECTION WITHOUT HEMATURIA, SITE UNSPECIFIED: ICD-10-CM

## 2025-07-22 DIAGNOSIS — R97.20 ELEVATED PSA: ICD-10-CM

## 2025-07-22 LAB
BACTERIA UR QL AUTO: NORMAL /HPF
BILIRUB UR QL STRIP: NEGATIVE
CLARITY UR: CLEAR
COLOR UR: YELLOW
GLUCOSE UR STRIP-MCNC: NEGATIVE MG/DL
HGB UR QL STRIP.AUTO: NEGATIVE
KETONES UR STRIP-MCNC: NEGATIVE MG/DL
LEUKOCYTE ESTERASE UR QL STRIP: NEGATIVE
NITRITE UR QL STRIP: NEGATIVE
NON-SQ EPI CELLS URNS QL MICRO: NORMAL /HPF
PH UR STRIP.AUTO: 6 [PH]
PROT UR STRIP-MCNC: NEGATIVE MG/DL
PSA SERPL-MCNC: 4.54 NG/ML (ref 0–4)
RBC #/AREA URNS AUTO: NORMAL /HPF
SP GR UR STRIP.AUTO: 1.01 (ref 1–1.03)
UROBILINOGEN UR STRIP-ACNC: <2 MG/DL
VIT B12 SERPL-MCNC: 302 PG/ML (ref 180–914)
WBC #/AREA URNS AUTO: NORMAL /HPF

## 2025-07-22 PROCEDURE — 81001 URINALYSIS AUTO W/SCOPE: CPT

## 2025-07-22 PROCEDURE — 82607 VITAMIN B-12: CPT

## 2025-07-22 PROCEDURE — G0103 PSA SCREENING: HCPCS

## 2025-07-22 PROCEDURE — 36415 COLL VENOUS BLD VENIPUNCTURE: CPT
